# Patient Record
Sex: FEMALE | Race: WHITE | NOT HISPANIC OR LATINO | Employment: OTHER | URBAN - METROPOLITAN AREA
[De-identification: names, ages, dates, MRNs, and addresses within clinical notes are randomized per-mention and may not be internally consistent; named-entity substitution may affect disease eponyms.]

---

## 2019-06-12 ENCOUNTER — OFFICE VISIT CONVERTED (OUTPATIENT)
Dept: UROLOGY | Facility: CLINIC | Age: 54
End: 2019-06-12
Attending: UROLOGY

## 2019-06-12 ENCOUNTER — CONVERSION ENCOUNTER (OUTPATIENT)
Dept: SURGERY | Facility: CLINIC | Age: 54
End: 2019-06-12

## 2019-07-11 ENCOUNTER — HOSPITAL ENCOUNTER (OUTPATIENT)
Dept: PERIOP | Facility: HOSPITAL | Age: 54
Setting detail: HOSPITAL OUTPATIENT SURGERY
Discharge: HOME | End: 2019-07-11
Attending: UROLOGY

## 2019-07-22 LAB
COLOR STONE: NORMAL
COMPN STONE: NORMAL
CONV CA OXALATE DIHYDRATE: 10 %
CONV CA OXALATE MONOHYDRATE: 70 %
CONV CALCIUM PHOSPHATE: 20 %
CONV CALCULI COMMENT: NORMAL
CONV CALCULI DISCLAIMER: NORMAL
CONV CALCULI NOTE: NORMAL
NIDUS STONE QL: NORMAL
SIZE STONE: NORMAL MM
WT STONE: 31.5 MG

## 2019-07-26 ENCOUNTER — HOSPITAL ENCOUNTER (OUTPATIENT)
Dept: PERIOP | Facility: HOSPITAL | Age: 54
Setting detail: HOSPITAL OUTPATIENT SURGERY
Discharge: HOME | End: 2019-07-26
Attending: UROLOGY

## 2019-08-09 ENCOUNTER — HOSPITAL ENCOUNTER (OUTPATIENT)
Dept: PERIOP | Facility: HOSPITAL | Age: 54
Setting detail: HOSPITAL OUTPATIENT SURGERY
Discharge: HOME | End: 2019-08-09
Attending: UROLOGY

## 2019-08-16 ENCOUNTER — CONVERSION ENCOUNTER (OUTPATIENT)
Dept: SURGERY | Facility: CLINIC | Age: 54
End: 2019-08-16

## 2019-08-16 ENCOUNTER — OFFICE VISIT CONVERTED (OUTPATIENT)
Dept: UROLOGY | Facility: CLINIC | Age: 54
End: 2019-08-16
Attending: UROLOGY

## 2019-08-16 LAB
COLOR STONE: NORMAL
COMPN STONE: NORMAL
CONV CA OXALATE DIHYDRATE: 5 %
CONV CA OXALATE MONOHYDRATE: 70 %
CONV CALCIUM PHOSPHATE: 25 %
CONV CALCULI COMMENT: NORMAL
CONV CALCULI DISCLAIMER: NORMAL
CONV CALCULI NOTE: NORMAL
NIDUS STONE QL: NORMAL
SIZE STONE: NORMAL MM
WT STONE: 69.9 MG

## 2020-05-01 ENCOUNTER — TELEPHONE (OUTPATIENT)
Dept: NEUROSURGERY | Facility: CLINIC | Age: 55
End: 2020-05-01

## 2020-05-01 NOTE — TELEPHONE ENCOUNTER
5.1.20  lm for pt letting her know she needs to arrive at 10:30 am for paperwork and she MUST bring her films from [facility]/

## 2020-07-17 ENCOUNTER — APPOINTMENT (OUTPATIENT)
Dept: PREADMISSION TESTING | Facility: HOSPITAL | Age: 55
End: 2020-07-17

## 2020-07-17 VITALS
WEIGHT: 229 LBS | DIASTOLIC BLOOD PRESSURE: 62 MMHG | SYSTOLIC BLOOD PRESSURE: 113 MMHG | HEIGHT: 68 IN | BODY MASS INDEX: 34.71 KG/M2 | TEMPERATURE: 98.2 F | HEART RATE: 71 BPM | OXYGEN SATURATION: 99 % | RESPIRATION RATE: 16 BRPM

## 2020-07-17 LAB
ALBUMIN SERPL-MCNC: 4.1 G/DL (ref 3.5–5.2)
ALBUMIN/GLOB SERPL: 1.5 G/DL
ALP SERPL-CCNC: 67 U/L (ref 39–117)
ALT SERPL W P-5'-P-CCNC: 42 U/L (ref 1–33)
ANION GAP SERPL CALCULATED.3IONS-SCNC: 9 MMOL/L (ref 5–15)
AST SERPL-CCNC: 23 U/L (ref 1–32)
BASOPHILS # BLD AUTO: 0.03 10*3/MM3 (ref 0–0.2)
BASOPHILS NFR BLD AUTO: 0.4 % (ref 0–1.5)
BILIRUB SERPL-MCNC: 0.2 MG/DL (ref 0–1.2)
BUN SERPL-MCNC: 10 MG/DL (ref 6–20)
BUN/CREAT SERPL: 14.5 (ref 7–25)
CALCIUM SPEC-SCNC: 9.3 MG/DL (ref 8.6–10.5)
CHLORIDE SERPL-SCNC: 105 MMOL/L (ref 98–107)
CO2 SERPL-SCNC: 23 MMOL/L (ref 22–29)
CREAT SERPL-MCNC: 0.69 MG/DL (ref 0.57–1)
DEPRECATED RDW RBC AUTO: 42.1 FL (ref 37–54)
EOSINOPHIL # BLD AUTO: 0.1 10*3/MM3 (ref 0–0.4)
EOSINOPHIL NFR BLD AUTO: 1.4 % (ref 0.3–6.2)
ERYTHROCYTE [DISTWIDTH] IN BLOOD BY AUTOMATED COUNT: 12.9 % (ref 12.3–15.4)
GFR SERPL CREATININE-BSD FRML MDRD: 89 ML/MIN/1.73
GLOBULIN UR ELPH-MCNC: 2.8 GM/DL
GLUCOSE SERPL-MCNC: 91 MG/DL (ref 65–99)
HCT VFR BLD AUTO: 41.5 % (ref 34–46.6)
HGB BLD-MCNC: 13.7 G/DL (ref 12–15.9)
IMM GRANULOCYTES # BLD AUTO: 0.03 10*3/MM3 (ref 0–0.05)
IMM GRANULOCYTES NFR BLD AUTO: 0.4 % (ref 0–0.5)
LYMPHOCYTES # BLD AUTO: 2.4 10*3/MM3 (ref 0.7–3.1)
LYMPHOCYTES NFR BLD AUTO: 34.6 % (ref 19.6–45.3)
MCH RBC QN AUTO: 29.7 PG (ref 26.6–33)
MCHC RBC AUTO-ENTMCNC: 33 G/DL (ref 31.5–35.7)
MCV RBC AUTO: 90 FL (ref 79–97)
MONOCYTES # BLD AUTO: 0.79 10*3/MM3 (ref 0.1–0.9)
MONOCYTES NFR BLD AUTO: 11.4 % (ref 5–12)
NEUTROPHILS NFR BLD AUTO: 3.58 10*3/MM3 (ref 1.7–7)
NEUTROPHILS NFR BLD AUTO: 51.8 % (ref 42.7–76)
NRBC BLD AUTO-RTO: 0 /100 WBC (ref 0–0.2)
PLATELET # BLD AUTO: 343 10*3/MM3 (ref 140–450)
PMV BLD AUTO: 9 FL (ref 6–12)
POTASSIUM SERPL-SCNC: 4.4 MMOL/L (ref 3.5–5.2)
PROT SERPL-MCNC: 6.9 G/DL (ref 6–8.5)
RBC # BLD AUTO: 4.61 10*6/MM3 (ref 3.77–5.28)
SODIUM SERPL-SCNC: 137 MMOL/L (ref 136–145)
WBC # BLD AUTO: 6.93 10*3/MM3 (ref 3.4–10.8)

## 2020-07-17 PROCEDURE — C9803 HOPD COVID-19 SPEC COLLECT: HCPCS | Performed by: ORTHOPAEDIC SURGERY

## 2020-07-17 PROCEDURE — 85025 COMPLETE CBC W/AUTO DIFF WBC: CPT | Performed by: ORTHOPAEDIC SURGERY

## 2020-07-17 PROCEDURE — 93010 ELECTROCARDIOGRAM REPORT: CPT | Performed by: INTERNAL MEDICINE

## 2020-07-17 PROCEDURE — 80053 COMPREHEN METABOLIC PANEL: CPT | Performed by: ORTHOPAEDIC SURGERY

## 2020-07-17 PROCEDURE — 93005 ELECTROCARDIOGRAM TRACING: CPT

## 2020-07-17 PROCEDURE — U0004 COV-19 TEST NON-CDC HGH THRU: HCPCS | Performed by: ORTHOPAEDIC SURGERY

## 2020-07-17 PROCEDURE — 36415 COLL VENOUS BLD VENIPUNCTURE: CPT

## 2020-07-17 RX ORDER — FUROSEMIDE 40 MG/1
40 TABLET ORAL DAILY PRN
COMMUNITY
End: 2021-03-01

## 2020-07-17 NOTE — DISCHARGE INSTRUCTIONS
Take the following medications the morning of surgery:    NONE      ARRIVE AT 5:45    General Instructions:  • Do not eat solid food after midnight the night before surgery.  • You may drink clear liquids day of surgery but must stop at least one hour before your hospital arrival time.  • It is beneficial for you to have a clear drink that contains carbohydrates the day of surgery.  We suggest a 12 to 20 ounce bottle of Gatorade or Powerade for non-diabetic patients or a 12 to 20 ounce bottle of G2 or Powerade Zero for diabetic patients. (Pediatric patients, are not advised to drink a 12 to 20 ounce carbohydrate drink)    Clear liquids are liquids you can see through.  Nothing red in color.     Plain water                               Sports drinks  Sodas                                   Gelatin (Jell-O)  Fruit juices without pulp such as white grape juice and apple juice  Popsicles that contain no fruit or yogurt  Tea or coffee (no cream or milk added)  Gatorade / Powerade  G2 / Powerade Zero    • Infants may have breast milk up to four hours before surgery.  • Infants drinking formula may drink formula up to six hours before surgery.   • Patients who avoid smoking, chewing tobacco and alcohol for 4 weeks prior to surgery have a reduced risk of post-operative complications.  Quit smoking as many days before surgery as you can.  • Do not smoke, use chewing tobacco or drink alcohol the day of surgery.   • If applicable bring your C-PAP/ BI-PAP machine.  • Bring any papers given to you in the doctor’s office.  • Wear clean comfortable clothes.  • Do not wear contact lenses, false eyelashes or make-up.  Bring a case for your glasses.   • Bring crutches or walker if applicable.  • Remove all piercings.  Leave jewelry and any other valuables at home.  • Hair extensions with metal clips must be removed prior to surgery.  • The Pre-Admission Testing nurse will instruct you to bring medications if unable to obtain an  accurate list in Pre-Admission Testing.        If you were given a blood bank ID arm band remember to bring it with you the day of surgery.    Preventing a Surgical Site Infection:  • For 2 to 3 days before surgery, avoid shaving with a razor because the razor can irritate skin and make it easier to develop an infection.    • Any areas of open skin can increase the risk of a post-operative wound infection by allowing bacteria to enter and travel throughout the body.  Notify your surgeon if you have any skin wounds / rashes even if it is not near the expected surgical site.  The area will need assessed to determine if surgery should be delayed until it is healed.  • The night prior to surgery shower using a fresh bar of anti-bacterial soap (such as Dial) and clean washcloth.  Sleep in a clean bed with clean clothing.  Do not allow pets to sleep with you.  • Shower on the morning of surgery using a fresh bar of anti-bacterial soap (such as Dial) and clean washcloth.  Dry with a clean towel and dress in clean clothing.  • Ask your surgeon if you will be receiving antibiotics prior to surgery.  • Make sure you, your family, and all healthcare providers clean their hands with soap and water or an alcohol based hand  before caring for you or your wound.    Day of surgery:  Your arrival time is approximately two hours before your scheduled surgery time.  Upon arrival, a Pre-op nurse and Anesthesiologist will review your health history, obtain vital signs, and answer questions you may have.  The only belongings needed at this time will be a list of your home medications and if applicable your C-PAP/BI-PAP machine.  If you are staying overnight your family can leave the rest of your belongings in the car and bring them to your room later.  A Pre-op nurse will start an IV and you may receive medication in preparation for surgery, including something to help you relax.  Your family will be able to see you in the  Pre-op area.  Two visitors at a time will be allowed in the Pre-op room.  While you are in surgery your family should notify the waiting room  if they leave the waiting room area and provide a contact phone number.    Please be aware that surgery does come with discomfort.  We want to make every effort to control your discomfort so please discuss any uncontrolled symptoms with your nurse.   Your doctor will most likely have prescribed pain medications.      If you are going home after surgery you will receive individualized written care instructions before being discharged.  A responsible adult must drive you to and from the hospital on the day of your surgery and stay with you for 24 hours.    If you are staying overnight following surgery, you will be transported to your hospital room following the recovery period.  [facility] has all private rooms.    If you have any questions please call Pre-Admission Testing at [phone].  Deductibles and co-payments are collected on the day of service. Please be prepared to pay the required co-pay, deductible or deposit on the day of service as defined by your plan.    Patient Education for Self-Quarantine Process    Following your COVID testing, we strongly recommend that you do not leave your home after you have been tested for COVID except to get medical care. This includes not going to work, school or to public areas.  If this is not possible for you to do please limit your activities to only required outings.  Be sure to wear a mask when you are with other people, practice social distancing and wash your hands frequently.      The following items provide additional details to keep you safe.  • Wash your hands with soap and water frequently for at least 20 seconds.   • Avoid touching your eyes, nose and mouth with unwashed hands.  • Do not share anything - utensils, towels, food from the same bowl.   • Have your own utensils, drinking glass,  dishes, towels and bedding.   • Do not have visitors.   • Do use FaceTime to stay in touch with family and friends.  • You should stay in a specific room away from others if possible.   • Stay at least 6 feet away from others in the home if you cannot have a dedicated room to yourself.   • Do not snuggle with your pet. While the CDC says there is no evidence that pets can spread COVID-19 or be infected from humans, it is probably best to avoid “petting, snuggling, being kissed or licked and sharing food (during self-quarantine)”, according to the CDC.   • Sanitize household surfaces daily. Include all high touch areas (door handles, light switches, phones, countertops, etc.)  • Do not share a bathroom with others, if possible.   • Wear a mask around others in your home if you are unable to stay in a separate room or 6 feet apart. If  you are unable to wear a mask, have your family member wear a mask if they must be within 6 feet of you.   Call your surgeon immediately if you experience any of the following symptoms:  • Sore Throat  • Shortness of Breath or difficulty breathing  • Cough  • Chills  • Body soreness or muscle pain  • Headache  • Fever  • New loss of taste or smell  • Do not arrive for your surgery ill.  Your procedure will need to be rescheduled to another time.  You will need to call your physician before the day of surgery to avoid any unnecessary exposure to hospital staff as well as other patients.

## 2020-07-18 LAB
REF LAB TEST METHOD: NORMAL
SARS-COV-2 RNA RESP QL NAA+PROBE: NOT DETECTED

## 2020-07-20 ENCOUNTER — HOSPITAL ENCOUNTER (OUTPATIENT)
Facility: HOSPITAL | Age: 55
Setting detail: HOSPITAL OUTPATIENT SURGERY
Discharge: HOME | End: 2020-07-20
Attending: ORTHOPAEDIC SURGERY | Admitting: ORTHOPAEDIC SURGERY

## 2020-07-20 ENCOUNTER — ANESTHESIA (OUTPATIENT)
Dept: PERIOP | Facility: HOSPITAL | Age: 55
End: 2020-07-20

## 2020-07-20 ENCOUNTER — ANESTHESIA EVENT (OUTPATIENT)
Dept: PERIOP | Facility: HOSPITAL | Age: 55
End: 2020-07-20

## 2020-07-20 VITALS
DIASTOLIC BLOOD PRESSURE: 85 MMHG | SYSTOLIC BLOOD PRESSURE: 116 MMHG | RESPIRATION RATE: 16 BRPM | HEART RATE: 106 BPM | TEMPERATURE: 97.9 F | BODY MASS INDEX: 35.26 KG/M2 | WEIGHT: 231.92 LBS | OXYGEN SATURATION: 94 %

## 2020-07-20 PROCEDURE — C9290 INJ, BUPIVACAINE LIPOSOME: HCPCS | Performed by: ANESTHESIOLOGY

## 2020-07-20 PROCEDURE — 25010000002 NEOSTIGMINE PER 0.5 MG: Performed by: NURSE ANESTHETIST, CERTIFIED REGISTERED

## 2020-07-20 PROCEDURE — 25010000002 PROPOFOL 10 MG/ML EMULSION: Performed by: NURSE ANESTHETIST, CERTIFIED REGISTERED

## 2020-07-20 PROCEDURE — 25010000002 FENTANYL CITRATE (PF) 100 MCG/2ML SOLUTION: Performed by: ANESTHESIOLOGY

## 2020-07-20 PROCEDURE — 25010000002 MIDAZOLAM PER 1 MG: Performed by: ANESTHESIOLOGY

## 2020-07-20 PROCEDURE — 25010000002 DEXAMETHASONE PER 1 MG: Performed by: NURSE ANESTHETIST, CERTIFIED REGISTERED

## 2020-07-20 PROCEDURE — 25010000003 BUPIVACAINE LIPOSOME 1.3 % SUSPENSION: Performed by: ANESTHESIOLOGY

## 2020-07-20 PROCEDURE — 25010000002 ONDANSETRON PER 1 MG: Performed by: NURSE ANESTHETIST, CERTIFIED REGISTERED

## 2020-07-20 PROCEDURE — C1713 ANCHOR/SCREW BN/BN,TIS/BN: HCPCS | Performed by: ORTHOPAEDIC SURGERY

## 2020-07-20 DEVICE — SYS SUT/ANCH ACHILLES SPEEDBRIDGE BIOCOMP: Type: IMPLANTABLE DEVICE | Status: FUNCTIONAL

## 2020-07-20 DEVICE — WAX BONE HEMO NAT 2.5G: Type: IMPLANTABLE DEVICE | Status: FUNCTIONAL

## 2020-07-20 RX ORDER — FAMOTIDINE 10 MG/ML
20 INJECTION, SOLUTION INTRAVENOUS ONCE
Status: COMPLETED | OUTPATIENT
Start: 2020-07-20 | End: 2020-07-20

## 2020-07-20 RX ORDER — EPHEDRINE SULFATE 50 MG/ML
5 INJECTION, SOLUTION INTRAVENOUS ONCE AS NEEDED
Status: DISCONTINUED | OUTPATIENT
Start: 2020-07-20 | End: 2020-07-20 | Stop reason: HOSPADM

## 2020-07-20 RX ORDER — MIDAZOLAM HYDROCHLORIDE 1 MG/ML
2 INJECTION INTRAMUSCULAR; INTRAVENOUS
Status: COMPLETED | OUTPATIENT
Start: 2020-07-20 | End: 2020-07-20

## 2020-07-20 RX ORDER — SODIUM CHLORIDE, SODIUM LACTATE, POTASSIUM CHLORIDE, CALCIUM CHLORIDE 600; 310; 30; 20 MG/100ML; MG/100ML; MG/100ML; MG/100ML
9 INJECTION, SOLUTION INTRAVENOUS CONTINUOUS
Status: DISCONTINUED | OUTPATIENT
Start: 2020-07-20 | End: 2020-07-20 | Stop reason: HOSPADM

## 2020-07-20 RX ORDER — ONDANSETRON 2 MG/ML
4 INJECTION INTRAMUSCULAR; INTRAVENOUS ONCE AS NEEDED
Status: DISCONTINUED | OUTPATIENT
Start: 2020-07-20 | End: 2020-07-20 | Stop reason: HOSPADM

## 2020-07-20 RX ORDER — PROMETHAZINE HYDROCHLORIDE 25 MG/1
25 SUPPOSITORY RECTAL ONCE AS NEEDED
Status: DISCONTINUED | OUTPATIENT
Start: 2020-07-20 | End: 2020-07-20 | Stop reason: HOSPADM

## 2020-07-20 RX ORDER — PROMETHAZINE HYDROCHLORIDE 25 MG/ML
6.25 INJECTION, SOLUTION INTRAMUSCULAR; INTRAVENOUS ONCE AS NEEDED
Status: DISCONTINUED | OUTPATIENT
Start: 2020-07-20 | End: 2020-07-20 | Stop reason: HOSPADM

## 2020-07-20 RX ORDER — HYDRALAZINE HYDROCHLORIDE 20 MG/ML
5 INJECTION INTRAMUSCULAR; INTRAVENOUS
Status: DISCONTINUED | OUTPATIENT
Start: 2020-07-20 | End: 2020-07-20 | Stop reason: HOSPADM

## 2020-07-20 RX ORDER — LIDOCAINE HYDROCHLORIDE 10 MG/ML
0.5 INJECTION, SOLUTION EPIDURAL; INFILTRATION; INTRACAUDAL; PERINEURAL ONCE AS NEEDED
Status: DISCONTINUED | OUTPATIENT
Start: 2020-07-20 | End: 2020-07-20 | Stop reason: HOSPADM

## 2020-07-20 RX ORDER — PROPOFOL 10 MG/ML
VIAL (ML) INTRAVENOUS AS NEEDED
Status: DISCONTINUED | OUTPATIENT
Start: 2020-07-20 | End: 2020-07-20 | Stop reason: SURG

## 2020-07-20 RX ORDER — BUPIVACAINE HYDROCHLORIDE AND EPINEPHRINE 2.5; 5 MG/ML; UG/ML
INJECTION, SOLUTION EPIDURAL; INFILTRATION; INTRACAUDAL; PERINEURAL
Status: COMPLETED | OUTPATIENT
Start: 2020-07-20 | End: 2020-07-20

## 2020-07-20 RX ORDER — OXYCODONE AND ACETAMINOPHEN 7.5; 325 MG/1; MG/1
1 TABLET ORAL ONCE AS NEEDED
Status: DISCONTINUED | OUTPATIENT
Start: 2020-07-20 | End: 2020-07-20 | Stop reason: HOSPADM

## 2020-07-20 RX ORDER — MAGNESIUM HYDROXIDE 1200 MG/15ML
LIQUID ORAL AS NEEDED
Status: DISCONTINUED | OUTPATIENT
Start: 2020-07-20 | End: 2020-07-20 | Stop reason: HOSPADM

## 2020-07-20 RX ORDER — CLINDAMYCIN PHOSPHATE 600 MG/50ML
600 INJECTION INTRAVENOUS EVERY 8 HOURS
Status: COMPLETED | OUTPATIENT
Start: 2020-07-20 | End: 2020-07-20

## 2020-07-20 RX ORDER — DEXAMETHASONE SODIUM PHOSPHATE 10 MG/ML
INJECTION INTRAMUSCULAR; INTRAVENOUS AS NEEDED
Status: DISCONTINUED | OUTPATIENT
Start: 2020-07-20 | End: 2020-07-20 | Stop reason: SURG

## 2020-07-20 RX ORDER — FLUMAZENIL 0.1 MG/ML
0.2 INJECTION INTRAVENOUS AS NEEDED
Status: DISCONTINUED | OUTPATIENT
Start: 2020-07-20 | End: 2020-07-20 | Stop reason: HOSPADM

## 2020-07-20 RX ORDER — HYDROMORPHONE HYDROCHLORIDE 1 MG/ML
0.5 INJECTION, SOLUTION INTRAMUSCULAR; INTRAVENOUS; SUBCUTANEOUS
Status: DISCONTINUED | OUTPATIENT
Start: 2020-07-20 | End: 2020-07-20 | Stop reason: HOSPADM

## 2020-07-20 RX ORDER — BUPIVACAINE HYDROCHLORIDE AND EPINEPHRINE 5; 5 MG/ML; UG/ML
INJECTION, SOLUTION EPIDURAL; INTRACAUDAL; PERINEURAL
Status: COMPLETED | OUTPATIENT
Start: 2020-07-20 | End: 2020-07-20

## 2020-07-20 RX ORDER — SODIUM CHLORIDE 0.9 % (FLUSH) 0.9 %
3 SYRINGE (ML) INJECTION EVERY 12 HOURS SCHEDULED
Status: DISCONTINUED | OUTPATIENT
Start: 2020-07-20 | End: 2020-07-20 | Stop reason: HOSPADM

## 2020-07-20 RX ORDER — FENTANYL CITRATE 50 UG/ML
100 INJECTION, SOLUTION INTRAMUSCULAR; INTRAVENOUS
Status: DISCONTINUED | OUTPATIENT
Start: 2020-07-20 | End: 2020-07-20 | Stop reason: HOSPADM

## 2020-07-20 RX ORDER — GLYCOPYRROLATE 0.2 MG/ML
INJECTION INTRAMUSCULAR; INTRAVENOUS AS NEEDED
Status: DISCONTINUED | OUTPATIENT
Start: 2020-07-20 | End: 2020-07-20 | Stop reason: SURG

## 2020-07-20 RX ORDER — PROMETHAZINE HYDROCHLORIDE 25 MG/1
25 TABLET ORAL ONCE AS NEEDED
Status: DISCONTINUED | OUTPATIENT
Start: 2020-07-20 | End: 2020-07-20 | Stop reason: HOSPADM

## 2020-07-20 RX ORDER — ROCURONIUM BROMIDE 10 MG/ML
INJECTION, SOLUTION INTRAVENOUS AS NEEDED
Status: DISCONTINUED | OUTPATIENT
Start: 2020-07-20 | End: 2020-07-20 | Stop reason: SURG

## 2020-07-20 RX ORDER — FENTANYL CITRATE 50 UG/ML
50 INJECTION, SOLUTION INTRAMUSCULAR; INTRAVENOUS
Status: DISCONTINUED | OUTPATIENT
Start: 2020-07-20 | End: 2020-07-20 | Stop reason: HOSPADM

## 2020-07-20 RX ORDER — HYDROCODONE BITARTRATE AND ACETAMINOPHEN 7.5; 325 MG/1; MG/1
1 TABLET ORAL ONCE AS NEEDED
Status: DISCONTINUED | OUTPATIENT
Start: 2020-07-20 | End: 2020-07-20 | Stop reason: HOSPADM

## 2020-07-20 RX ORDER — LIDOCAINE HYDROCHLORIDE 20 MG/ML
INJECTION, SOLUTION INFILTRATION; PERINEURAL AS NEEDED
Status: DISCONTINUED | OUTPATIENT
Start: 2020-07-20 | End: 2020-07-20 | Stop reason: SURG

## 2020-07-20 RX ORDER — ONDANSETRON 2 MG/ML
INJECTION INTRAMUSCULAR; INTRAVENOUS AS NEEDED
Status: DISCONTINUED | OUTPATIENT
Start: 2020-07-20 | End: 2020-07-20 | Stop reason: SURG

## 2020-07-20 RX ORDER — SODIUM CHLORIDE 0.9 % (FLUSH) 0.9 %
3-10 SYRINGE (ML) INJECTION AS NEEDED
Status: DISCONTINUED | OUTPATIENT
Start: 2020-07-20 | End: 2020-07-20 | Stop reason: HOSPADM

## 2020-07-20 RX ADMIN — GLYCOPYRROLATE 0.2 MG: 0.2 INJECTION INTRAMUSCULAR; INTRAVENOUS at 07:27

## 2020-07-20 RX ADMIN — ONDANSETRON HYDROCHLORIDE 4 MG: 2 SOLUTION INTRAMUSCULAR; INTRAVENOUS at 07:30

## 2020-07-20 RX ADMIN — LIDOCAINE HYDROCHLORIDE 40 MG: 20 INJECTION, SOLUTION INFILTRATION; PERINEURAL at 07:30

## 2020-07-20 RX ADMIN — FENTANYL CITRATE 100 MCG: 50 INJECTION, SOLUTION INTRAMUSCULAR; INTRAVENOUS at 06:33

## 2020-07-20 RX ADMIN — MIDAZOLAM 2 MG: 1 INJECTION INTRAMUSCULAR; INTRAVENOUS at 06:33

## 2020-07-20 RX ADMIN — Medication 2.5 MG: at 08:40

## 2020-07-20 RX ADMIN — ROCURONIUM BROMIDE 50 MG: 10 INJECTION, SOLUTION INTRAVENOUS at 07:30

## 2020-07-20 RX ADMIN — GLYCOPYRROLATE 0.4 MG: 0.2 INJECTION INTRAMUSCULAR; INTRAVENOUS at 08:40

## 2020-07-20 RX ADMIN — FAMOTIDINE 20 MG: 10 INJECTION, SOLUTION INTRAVENOUS at 06:25

## 2020-07-20 RX ADMIN — MIDAZOLAM 2 MG: 1 INJECTION INTRAMUSCULAR; INTRAVENOUS at 07:38

## 2020-07-20 RX ADMIN — PROPOFOL 100 MG: 10 INJECTION, EMULSION INTRAVENOUS at 07:38

## 2020-07-20 RX ADMIN — DEXAMETHASONE SODIUM PHOSPHATE 8 MG: 10 INJECTION INTRAMUSCULAR; INTRAVENOUS at 07:39

## 2020-07-20 RX ADMIN — SODIUM CHLORIDE, POTASSIUM CHLORIDE, SODIUM LACTATE AND CALCIUM CHLORIDE 9 ML/HR: 600; 310; 30; 20 INJECTION, SOLUTION INTRAVENOUS at 06:24

## 2020-07-20 RX ADMIN — BUPIVACAINE 10 ML: 13.3 INJECTION, SUSPENSION, LIPOSOMAL INFILTRATION at 06:46

## 2020-07-20 RX ADMIN — BUPIVACAINE HYDROCHLORIDE AND EPINEPHRINE 10 ML: 2.5; 5 INJECTION, SOLUTION EPIDURAL; INFILTRATION; INTRACAUDAL; PERINEURAL at 06:46

## 2020-07-20 RX ADMIN — CLINDAMYCIN PHOSPHATE 600 MG: 12 INJECTION, SOLUTION INTRAVENOUS at 07:41

## 2020-07-20 RX ADMIN — BUPIVACAINE HYDROCHLORIDE AND EPINEPHRINE BITARTRATE 10 ML: 5; .0091 INJECTION, SOLUTION EPIDURAL; INTRACAUDAL; PERINEURAL at 06:46

## 2020-07-20 RX ADMIN — PROPOFOL 200 MG: 10 INJECTION, EMULSION INTRAVENOUS at 07:30

## 2020-07-20 NOTE — DISCHARGE INSTRUCTIONS
WHAT YOU CAN EXPECT  1. Swelling:  This is to be expected. It is difficult to specify what constitutes as an abnormal amount of swelling. You can minimize this by staying off your feet, keeping the foot elevated and applying ice.  2. Pain:  Everyone experiences pain, unfortunately, some worse than others.  You will be given a prescription for pain medication before you leave the hospital, either by Dr. Rodriguez or one of his assistants.  Please feel free to check with us if you are allergic to any pain medication. If you have had local anesthesia, start taking the medication when the anesthesia begins to wear off.  3. Bleeding:  This always occurs.  You will notice slight oozing occasionally through the bandages.  If bleeding continues and soaks through the dressing please call us.    WHAT TO DO AFTER SURGERY  1. When you return home you must rest.  You may either sit in a chair or in bed, with the foot elevated.  The position of the foot should be above the level of the heart.  Propping the foot up on a few pillows always helps.  2. Do not do any excessive or unnecessary walking during the first few days after surgery.  May get up during the first 48 hours only to eat and use the restroom.  Each operation is slightly different, and you may be specifically told that no walking is allowed at all for a period of time.  Under these circumstances, you will be best off using a crutch or walker.  3. If you are given a special shoe to use after surgery, you may not walk without it.  You do not, however, have to wear the shoe at night.  You will find it easier to commence walking on your heel and put more weight on the flat foot over the course of the next few days.  4. Use ice over the foot for about 24 hours after surgery.  The small commercially available ice packs are useless.  Fill a large garbage bag with ice and prop the bag over the foot, not on the ankle.  Alternatively, place the ice bag on the bed and turn on your  side with the foot lying directly on the ice pack.  5. If you are taking pain medication, there are common side effects such as dizziness, drowsiness, and nausea.  If these or an allergic reaction occur, stop taking the medication and call us.  To prevent nausea, eat prior to taking the medication.    DO NOT DO ANY OF THE FOLLOWING  1. Do not get the bandages wet.  When bathing, either sponge bathe or hang the foot over the side of the bath.  The safest is to get into the empty tub with the shoe on, elevate the foot, and then fill the tub.  Preferably, empty the tub prior to getting out.  Showering is possible with commercial plastic protectors.  2. Don't remove your dressing unless specifically instructed to do so. You may loosen the elastic wrap if needed for tightness.    *Please understand that the postoperative course varies from patient to patient, and these are only meant as guidelines to a smoother recovery.  These instructions do not cover all aspects of your post-operative care and recovery and if you have any questions which you feel are unanswered by this instruction sheet, please call us.    Please call us if you develop a fever associated with redness or drainage around the wound.  We are interested in your prompt and healthy recovery.  Please cooperate with the above instructions. Please call the office for a follow-up appt at [phone].

## 2020-07-20 NOTE — ANESTHESIA PROCEDURE NOTES
Airway  Urgency: elective    Date/Time: 7/20/2020 7:32 AM  Airway not difficult    General Information and Staff    Patient location during procedure: OR  Anesthesiologist: Matt Abdi MD  CRNA: Aydee Roland CRNA    Indications and Patient Condition  Indications for airway management: airway protection    Preoxygenated: yes  Mask difficulty assessment: 2 - vent by mask + OA or adjuvant +/- NMBA    Final Airway Details  Final airway type: endotracheal airway      Successful airway: ETT  Cuffed: yes   Successful intubation technique: direct laryngoscopy  Facilitating devices/methods: intubating stylet  Endotracheal tube insertion site: oral  Blade: Fang  Blade size: 2  ETT size (mm): 7.0  Cormack-Lehane Classification: grade I - full view of glottis  Placement verified by: chest auscultation and capnometry   Cuff volume (mL): 7  Measured from: teeth  ETT/EBT  to teeth (cm): 20  Number of attempts at approach: 1  Assessment: lips, teeth, and gum same as pre-op and atraumatic intubation

## 2020-07-20 NOTE — ANESTHESIA POSTPROCEDURE EVALUATION
Patient: Dorita Narayan    Procedure Summary     Date:  07/20/20 Room / Location:   [facility]  [facility]    Anesthesia Start:  0723 Anesthesia Stop:  0849    Procedure:  RIGHT CALCANEAL EXOSTECTOMY AND RETROCALCANEAL BURSECTOMY, SECONDARY ACHILLES TENDON REPAIR RECONSTRUCTION ACHILLES TENDON REPAIR (Right Ankle) Diagnosis:      Surgeon:  Chetan Rodriguez MD Provider:  Matt Abdi MD    Anesthesia Type:  general ASA Status:  2          Anesthesia Type: general    Vitals  Vitals Value Taken Time   /97 7/20/2020 10:00 AM   Temp 36.6 °C (97.9 °F) 7/20/2020 10:00 AM   Pulse 70 7/20/2020 10:01 AM   Resp 16 7/20/2020 10:00 AM   SpO2 95 % 7/20/2020 10:00 AM           Post Anesthesia Care and Evaluation    Patient location during evaluation: bedside  Patient participation: complete - patient participated  Level of consciousness: awake  Pain score: 1  Pain management: adequate  Airway patency: patent  Anesthetic complications: No anesthetic complications  PONV Status: controlled  Cardiovascular status: acceptable  Respiratory status: acceptable  Hydration status: acceptable    Comments: --------------------            07/20/20               1003     --------------------   BP:       116/85     Pulse:     106       Resp:       16       Temp:                SpO2:      94%      --------------------

## 2020-07-20 NOTE — ANESTHESIA PROCEDURE NOTES
Peripheral Block    Pre-sedation assessment completed: 7/20/2020 6:35 AM    Patient reassessed immediately prior to procedure    Patient location during procedure: pre-op  Start time: 7/20/2020 6:35 AM  Stop time: 7/20/2020 6:45 AM  Reason for block: at surgeon's request and post-op pain management  Performed by  Anesthesiologist: Andi Alejandro MD  Preanesthetic Checklist  Completed: patient identified, site marked, surgical consent, pre-op evaluation, timeout performed, IV checked, risks and benefits discussed and monitors and equipment checked  Prep:  Pt Position: supine  Sterile barriers:cap, gloves, mask and sterile barriers  Prep: ChloraPrep  Patient monitoring: blood pressure monitoring, continuous pulse oximetry and EKG  Procedure  Sedation:yes  Performed under: local infiltration  Guidance:ultrasound guided  ULTRASOUND INTERPRETATION.  Using ultrasound guidance a 22 G gauge needle was placed in close proximity to the femoral and sciatic nerve, at which point, under ultrasound guidance anesthetic was injected in the area of the nerve and spread of the anesthesia was seen on ultrasound in close proximity thereto.  There were no abnormalities seen on ultrasound; a digital image was taken; and the patient tolerated the procedure with no complications. Images:still images obtained    Laterality:right  Block Type:adductor canal block, popliteal and sciatic  Injection Technique:single-shot  Needle Type:echogenic  Needle Gauge:22 G  Resistance on Injection: less than 15 psi    Medications Used: bupivacaine liposome (EXPAREL) 1.3 % injection, 10 mL  bupivacaine-EPINEPHrine PF (MARCAINE w/EPI) 0.25% -1:471078 injection, 10 mL  bupivacaine-EPINEPHrine PF (MARCAINE w/EPI) 0.5% -1:088155 injection, 10 mL  Med admintered at 7/20/2020 6:46 AM      Post Assessment  Injection Assessment: negative aspiration for heme, no paresthesia on injection and incremental injection  Patient Tolerance:comfortable throughout  block  Complications:no  Additional Notes

## 2020-07-20 NOTE — ANESTHESIA PREPROCEDURE EVALUATION
Anesthesia Evaluation     Patient summary reviewed and Nursing notes reviewed   NPO Solid Status: > 8 hours             Airway   Mallampati: II  TM distance: >3 FB  Neck ROM: full  no difficulty expected  Dental - normal exam     Pulmonary - negative pulmonary ROS and normal exam   Cardiovascular - negative cardio ROS and normal exam        Neuro/Psych- negative ROS  GI/Hepatic/Renal/Endo    (+) obesity,       Musculoskeletal (-) negative ROS    Abdominal  - normal exam   Substance History - negative use     OB/GYN negative ob/gyn ROS         Other                        Anesthesia Plan    ASA 2     general   (Pop/add popc)  intravenous induction     Anesthetic plan, all risks, benefits, and alternatives have been provided, discussed and informed consent has been obtained with: patient.    Plan discussed with CRNA.

## 2020-07-20 NOTE — OP NOTE
Orthopedic Operative Report      Patient: Dorita Narayan  YOB: 1965  Medical Record Number: [id number]  Date: 7/20/2020    Attending Provider: Chetan Rodriguez MD  Assistant: none     Admitting Diagnosis: Right calcaneal exostosis, retrocalcaneal bursitis, partial Achilles tendon rear  Post Procedure Diagnosis: Same    Anesthesia: General   Complications: None  Estimated Blood Loss: minimal  Specimen: None  Drain: none                             Surgical Procedure:  Excision of right calcaneal exostosis and heterotopic ossification, excision of retrocalcaneal bursa, Secondary Achilles Tendon Reconstruction    After appropriate preoperative consultation, the patient was made comfortable in the supine position on the transfer gurney and appropriate anesthesia administered.  A surgical pause was undertaken to identify the appropriate patient, surgical site, surgeon, and the surgeon’s initials on the operative limb.  The patient was then very carefully transferred to the prone position on the operating table where great care was taken to pad all bony prominences and axillary support was placed bilaterally.  Abdomen was allowed to hang free.  Parenteral antibiotics are given.  A well-padded proximal thigh tourniquet was applied.  Prep and drape was done in the usual sterile fashion for the involved extremity distal to the knee.  Anatomic landmarks were noted and marked with a pen at the landmarks.     Straight longitudinal posterior approach was made on the distal extremity and carried onto the posterior heel.  Sharp dissection was carried through skin and dermis only protecting the nerves.  Flaps are not raised and a no-touch tissue technique was used.  Blunt dissection was taken deep to dermis.  Cautery used sparingly as needed for hemostasis.  The peritenon was then incised longitudinally for the length of the wound in the direction of the fibers, identified and saved as a discrete anatomic layer for  repair at the end of the case.  A central Achilles approach was then used carried down to the heel and Achilles insertion point.  Nonviable tendon was identified.  Meticulous dissection and debridement of the nonviable torn and frayed dysvascular tendon segments are resected along with the heterotopic ossification that is found within the substance of the tendon.  We dissected as far medially and laterally as possible without detaching the native Achilles insertion points.  Scissor, microsaggital saw, and rongeur, as well as a file were then used to remove the central distal portion of the patient's calcaneal exostosis and heterotopic bone.  This area was made fresh and allowed to bleed for healing purposes in the future.    We next placed, after gentle dissection medially and laterally without detaching any fibers, Abel retractors.  Microsagittal saw and chisel blade were then used to resect the Enrike prominence, which was quite large.  It had been serrating the anterior tendon fibers.      The wound was then copiously irrigated, meticulous hemostasis obtained.  Bone wax was used on the Enrike prominence resection cancellous bone only.  Wound was irrigated again and another check of hemostasis done.  An anchoring suture of 0 Vicryl was placed proximally and distally to prevent propagation of the tear burying the knots anteriorly and within the substance of the tendon.  There was absolutely no medial, lateral or anterior stripping of blood supply.  Another check of fine tuning of the debridement is done sharply.    A double row Arthrex Speed Bridge anchor technique was then used to reattach the Achilles to this nice calcaneal footprint at the appropriate gastrosoleus length and tension.  This was a knotless technique with no retrocalcaneal prominence.  Sterile bone wax was used on the cancellous bone where the Enrike prominence was removed.    We then meticulously repaired in layers starting anterior and deep  burying the knots with 0 Vicryl simple sutures.  The tendon was then repaired side to side in anatomic fashion at the appropriate length and tension relationship to the gastrocsoleus complex.  Tapered needles were used.  2-0 and 3-0 Vicryl were used as well as needed.  Even with the most superficial fibers for repairing the peritenon, all of the knots were buried and left within the substance of the tendon.     Circulation had returned promptly upon releasing the tourniquet and all flaps were viable.  There was excellent range of motion and palpable continuity in the remaining tendon.  Inverted Vicryl sutures were used to approximate the dermis so there was absolutely no tension on the skin edges, which was everted without tension and repaired with staples and Prolene where indicated.  A sterile dressing was applied with gentle Ace wrap compression over anterior and posterior plaster splints with the foot and ankle in gentle equinus to protect the repair.  The patient was turned to the supine position on the transfer [facility] after having tolerated the procedure well.               Chetan Rodriguez MD  7/20/2020  08:42

## 2020-08-25 ENCOUNTER — HOSPITAL ENCOUNTER (OUTPATIENT)
Dept: CARDIOLOGY | Facility: HOSPITAL | Age: 55
Discharge: HOME | End: 2020-08-25
Admitting: ORTHOPAEDIC SURGERY

## 2020-08-25 ENCOUNTER — TRANSCRIBE ORDERS (OUTPATIENT)
Dept: ADMINISTRATIVE | Facility: HOSPITAL | Age: 55
End: 2020-08-25

## 2020-08-25 ENCOUNTER — HOSPITAL ENCOUNTER (EMERGENCY)
Facility: HOSPITAL | Age: 55
Discharge: HOME | End: 2020-08-25
Attending: EMERGENCY MEDICINE | Admitting: EMERGENCY MEDICINE

## 2020-08-25 VITALS
HEIGHT: 68 IN | HEART RATE: 85 BPM | TEMPERATURE: 99.1 F | OXYGEN SATURATION: 97 % | RESPIRATION RATE: 17 BRPM | WEIGHT: 212 LBS | BODY MASS INDEX: 32.13 KG/M2 | DIASTOLIC BLOOD PRESSURE: 94 MMHG | SYSTOLIC BLOOD PRESSURE: 153 MMHG

## 2020-08-25 DIAGNOSIS — M79.661 PAIN AND SWELLING OF RIGHT LOWER LEG: ICD-10-CM

## 2020-08-25 DIAGNOSIS — M79.89 PAIN AND SWELLING OF RIGHT LOWER LEG: Primary | ICD-10-CM

## 2020-08-25 DIAGNOSIS — M79.89 PAIN AND SWELLING OF RIGHT LOWER LEG: ICD-10-CM

## 2020-08-25 DIAGNOSIS — I82.401 ACUTE DEEP VEIN THROMBOSIS (DVT) OF RIGHT LOWER EXTREMITY, UNSPECIFIED VEIN (HCC): Primary | ICD-10-CM

## 2020-08-25 DIAGNOSIS — M79.661 PAIN AND SWELLING OF RIGHT LOWER LEG: Primary | ICD-10-CM

## 2020-08-25 LAB
ALBUMIN SERPL-MCNC: 4.2 G/DL (ref 3.5–5.2)
ALBUMIN/GLOB SERPL: 1.6 G/DL
ALP SERPL-CCNC: 63 U/L (ref 39–117)
ALT SERPL W P-5'-P-CCNC: 30 U/L (ref 1–33)
ANION GAP SERPL CALCULATED.3IONS-SCNC: 8.3 MMOL/L (ref 5–15)
APTT PPP: 28.2 SECONDS (ref 22.7–35.4)
AST SERPL-CCNC: 30 U/L (ref 1–32)
BASOPHILS # BLD AUTO: 0.05 10*3/MM3 (ref 0–0.2)
BASOPHILS NFR BLD AUTO: 0.6 % (ref 0–1.5)
BH CV LOW VAS RIGHT DISTAL FEMORAL SPONT: 1
BH CV LOW VAS RIGHT GASTRONEMIUS VESSEL: 1
BH CV LOW VAS RIGHT PERONEAL VESSEL: 1
BH CV LOW VAS RIGHT POPLITEAL SPONT: 1
BH CV LOW VAS RIGHT SOLEAL VESSEL: 1
BH CV LOWER VASCULAR LEFT COMMON FEMORAL AUGMENT: NORMAL
BH CV LOWER VASCULAR LEFT COMMON FEMORAL COMPETENT: NORMAL
BH CV LOWER VASCULAR LEFT COMMON FEMORAL COMPRESS: NORMAL
BH CV LOWER VASCULAR LEFT COMMON FEMORAL PHASIC: NORMAL
BH CV LOWER VASCULAR LEFT COMMON FEMORAL SPONT: NORMAL
BH CV LOWER VASCULAR RIGHT COMMON FEMORAL AUGMENT: NORMAL
BH CV LOWER VASCULAR RIGHT COMMON FEMORAL COMPETENT: NORMAL
BH CV LOWER VASCULAR RIGHT COMMON FEMORAL COMPRESS: NORMAL
BH CV LOWER VASCULAR RIGHT COMMON FEMORAL PHASIC: NORMAL
BH CV LOWER VASCULAR RIGHT COMMON FEMORAL SPONT: NORMAL
BH CV LOWER VASCULAR RIGHT DISTAL FEMORAL AUGMENT: NORMAL
BH CV LOWER VASCULAR RIGHT DISTAL FEMORAL COMPETENT: NORMAL
BH CV LOWER VASCULAR RIGHT DISTAL FEMORAL COMPRESS: NORMAL
BH CV LOWER VASCULAR RIGHT DISTAL FEMORAL PHASIC: NORMAL
BH CV LOWER VASCULAR RIGHT DISTAL FEMORAL SPONT: NORMAL
BH CV LOWER VASCULAR RIGHT DISTAL FEMORAL THROMBUS: NORMAL
BH CV LOWER VASCULAR RIGHT GASTRONEMIUS COMPRESS: NORMAL
BH CV LOWER VASCULAR RIGHT GASTRONEMIUS THROMBUS: NORMAL
BH CV LOWER VASCULAR RIGHT GREATER SAPH AK COMPRESS: NORMAL
BH CV LOWER VASCULAR RIGHT GREATER SAPH BK COMPRESS: NORMAL
BH CV LOWER VASCULAR RIGHT LESSER SAPH COMPRESS: NORMAL
BH CV LOWER VASCULAR RIGHT MID FEMORAL AUGMENT: NORMAL
BH CV LOWER VASCULAR RIGHT MID FEMORAL COMPETENT: NORMAL
BH CV LOWER VASCULAR RIGHT MID FEMORAL COMPRESS: NORMAL
BH CV LOWER VASCULAR RIGHT MID FEMORAL PHASIC: NORMAL
BH CV LOWER VASCULAR RIGHT MID FEMORAL SPONT: NORMAL
BH CV LOWER VASCULAR RIGHT PERONEAL COMPRESS: NORMAL
BH CV LOWER VASCULAR RIGHT PERONEAL THROMBUS: NORMAL
BH CV LOWER VASCULAR RIGHT POPLITEAL AUGMENT: NORMAL
BH CV LOWER VASCULAR RIGHT POPLITEAL COMPETENT: NORMAL
BH CV LOWER VASCULAR RIGHT POPLITEAL COMPRESS: NORMAL
BH CV LOWER VASCULAR RIGHT POPLITEAL PHASIC: NORMAL
BH CV LOWER VASCULAR RIGHT POPLITEAL SPONT: NORMAL
BH CV LOWER VASCULAR RIGHT POPLITEAL THROMBUS: NORMAL
BH CV LOWER VASCULAR RIGHT PROFUNDA FEMORAL COMPRESS: NORMAL
BH CV LOWER VASCULAR RIGHT PROXIMAL FEMORAL COMPRESS: NORMAL
BH CV LOWER VASCULAR RIGHT SAPHENOFEMORAL JUNCTION COMPRESS: NORMAL
BH CV LOWER VASCULAR RIGHT SOLEAL COMPRESS: NORMAL
BH CV LOWER VASCULAR RIGHT SOLEAL THROMBUS: NORMAL
BILIRUB SERPL-MCNC: 0.3 MG/DL (ref 0–1.2)
BUN SERPL-MCNC: 9 MG/DL (ref 6–20)
BUN/CREAT SERPL: 12.3 (ref 7–25)
CALCIUM SPEC-SCNC: 9.6 MG/DL (ref 8.6–10.5)
CHLORIDE SERPL-SCNC: 104 MMOL/L (ref 98–107)
CO2 SERPL-SCNC: 26.7 MMOL/L (ref 22–29)
CREAT SERPL-MCNC: 0.73 MG/DL (ref 0.57–1)
DEPRECATED RDW RBC AUTO: 44.8 FL (ref 37–54)
EOSINOPHIL # BLD AUTO: 0.11 10*3/MM3 (ref 0–0.4)
EOSINOPHIL NFR BLD AUTO: 1.4 % (ref 0.3–6.2)
ERYTHROCYTE [DISTWIDTH] IN BLOOD BY AUTOMATED COUNT: 13.7 % (ref 12.3–15.4)
GFR SERPL CREATININE-BSD FRML MDRD: 83 ML/MIN/1.73
GLOBULIN UR ELPH-MCNC: 2.7 GM/DL
GLUCOSE SERPL-MCNC: 89 MG/DL (ref 65–99)
HCT VFR BLD AUTO: 38.1 % (ref 34–46.6)
HGB BLD-MCNC: 12.5 G/DL (ref 12–15.9)
IMM GRANULOCYTES # BLD AUTO: 0.02 10*3/MM3 (ref 0–0.05)
IMM GRANULOCYTES NFR BLD AUTO: 0.2 % (ref 0–0.5)
INR PPP: 0.98 (ref 0.9–1.1)
LYMPHOCYTES # BLD AUTO: 2.62 10*3/MM3 (ref 0.7–3.1)
LYMPHOCYTES NFR BLD AUTO: 32.7 % (ref 19.6–45.3)
MCH RBC QN AUTO: 29.7 PG (ref 26.6–33)
MCHC RBC AUTO-ENTMCNC: 32.8 G/DL (ref 31.5–35.7)
MCV RBC AUTO: 90.5 FL (ref 79–97)
MONOCYTES # BLD AUTO: 0.95 10*3/MM3 (ref 0.1–0.9)
MONOCYTES NFR BLD AUTO: 11.8 % (ref 5–12)
NEUTROPHILS NFR BLD AUTO: 4.27 10*3/MM3 (ref 1.7–7)
NEUTROPHILS NFR BLD AUTO: 53.3 % (ref 42.7–76)
NRBC BLD AUTO-RTO: 0 /100 WBC (ref 0–0.2)
PLATELET # BLD AUTO: 308 10*3/MM3 (ref 140–450)
PMV BLD AUTO: 8.9 FL (ref 6–12)
POTASSIUM SERPL-SCNC: 4 MMOL/L (ref 3.5–5.2)
PROT SERPL-MCNC: 6.9 G/DL (ref 6–8.5)
PROTHROMBIN TIME: 12.9 SECONDS (ref 11.7–14.2)
RBC # BLD AUTO: 4.21 10*6/MM3 (ref 3.77–5.28)
SODIUM SERPL-SCNC: 139 MMOL/L (ref 136–145)
WBC # BLD AUTO: 8.02 10*3/MM3 (ref 3.4–10.8)

## 2020-08-25 PROCEDURE — 85730 THROMBOPLASTIN TIME PARTIAL: CPT | Performed by: EMERGENCY MEDICINE

## 2020-08-25 PROCEDURE — 99283 EMERGENCY DEPT VISIT LOW MDM: CPT

## 2020-08-25 PROCEDURE — 80053 COMPREHEN METABOLIC PANEL: CPT | Performed by: EMERGENCY MEDICINE

## 2020-08-25 PROCEDURE — 93971 EXTREMITY STUDY: CPT

## 2020-08-25 PROCEDURE — 85025 COMPLETE CBC W/AUTO DIFF WBC: CPT | Performed by: EMERGENCY MEDICINE

## 2020-08-25 PROCEDURE — 85610 PROTHROMBIN TIME: CPT | Performed by: EMERGENCY MEDICINE

## 2020-08-25 RX ORDER — POTASSIUM CHLORIDE 750 MG/1
TABLET, FILM COATED, EXTENDED RELEASE ORAL
COMMUNITY
Start: 2020-07-29 | End: 2021-03-01

## 2020-08-25 RX ORDER — SULFASALAZINE 500 MG/1
500 TABLET ORAL 2 TIMES DAILY
COMMUNITY
Start: 2020-07-23

## 2020-08-25 NOTE — PROGRESS NOTES
8/25/20 preliminary results are positive for above knee and calf acute deep vein thrombosis.  Paged on call doctor for [facility] at 5:30. Paged again at approximately 5:50. Waited until 6:15 and called the  (Nikkie) as I had not gotten a return page. She told me to call surgery and ask for ordering Dr. Gomez cell number. Left message on his cell phone at approximately 6:15PM. Spoke with my manager and was advised to urge patient to go to ED otherwise to get a hold of patients PCP.  While attempting to get ahold of PCP, Dr. Rodriguez called at approximately 7:15 and he also urged patient to go to the ED. On call Dr. Tatum called at 7:25. Patient was taken in wheelchair to the ED.

## 2020-08-26 NOTE — ED PROVIDER NOTES
EMERGENCY DEPARTMENT ENCOUNTER    Room Number:  44/44  Date of encounter:  8/25/2020  PCP: Alan Wilkerson APRN    HPI:  Context: Dorita Narayan is a 54 y.o. female who presents to the ED c/o chief complaint of right leg swelling.  Patient had a surgery with Dr. Rodriguez done at the end of last month.  Patient had swelling her entire leg, swelling has improved but continued to have swelling most significant at her foot and ankle.  Patient had her second follow-up visit with Dr. Rodriguez today, had order for an outpatient ultrasound, significant for DVT, begins in distal femoral and then extends distally.  No proximal clot found.  Patient was referred to the emergency department for further management.  Patient denies any redness or warmth to the leg.  No chest pain or shortness of breath.  No history of blood clots in the past.  Patient denies any hematemesis, no blood in stool, no dark tarry stools, no hematuria.    MEDICAL HISTORY REVIEW  Reviewed in EPIC    PAST MEDICAL HISTORY  Active Ambulatory Problems     Diagnosis Date Noted   • No Active Ambulatory Problems     Resolved Ambulatory Problems     Diagnosis Date Noted   • No Resolved Ambulatory Problems     Past Medical History:   Diagnosis Date   • Achilles tendonitis    • Edema    • Familial adenomatous polyposis    • History of cellulitis 2019   • History of kidney stones    • History of migraine        PAST SURGICAL HISTORY  Past Surgical History:   Procedure Laterality Date   • ACHILLES TENDON SURGERY Right 7/20/2020    Procedure: RIGHT CALCANEAL EXOSTECTOMY AND RETROCALCANEAL BURSECTOMY, SECONDARY ACHILLES TENDON REPAIR RECONSTRUCTION ACHILLES TENDON REPAIR;  Surgeon: Chetan Rodriguez MD;  [place] [facility] [place] [facility] [place]  Service: Orthopedics;  Laterality: Right;   • COLONOSCOPY     • CREATION / REVISION OF ILEOSTOMY / JEJUNOSTOMY     • KIDNEY STONE SURGERY      X2   • RESECTION SMALL BOWEL / CLOSURE ILEOSTOMY     • TUBAL ABDOMINAL LIGATION         FAMILY  HISTORY  Family History   Problem Relation Age of Onset   • Malig Hyperthermia Neg Hx        SOCIAL HISTORY  Social History     Socioeconomic History   • Marital status:      Spouse name: Not on file   • Number of children: Not on file   • Years of education: Not on file   • Highest education level: Not on file   Tobacco Use   • Smoking status: Former Smoker     Packs/day: 0.25     Years: 8.00     Pack years: 2.00     Types: Cigarettes     Last attempt to quit: 2020     Years since quittin.1   • Smokeless tobacco: Never Used   Substance and Sexual Activity   • Alcohol use: Defer   • Drug use: Never   • Sexual activity: Defer       ALLERGIES  Latex; Penicillins; and Iodine    The patient's allergies have been reviewed    REVIEW OF SYSTEMS  All systems reviewed and negative except for those discussed in HPI.     PHYSICAL EXAM  I have reviewed the triage vital signs and nursing notes.  ED Triage Vitals [20]   Temp Heart Rate Resp BP SpO2   99.1 °F (37.3 °C) 79 18 -- 99 %      Temp src Heart Rate Source Patient Position BP Location FiO2 (%)   Tympanic Monitor -- -- --     GENERAL: No acute distress  HENT: NCAT, PERRL, Nares patent  EYES: no scleral icterus  NECK: trachea midline, no ROM limitations  CV: regular rhythm, regular rate  RESPIRATORY: normal effort  ABDOMEN: soft  : deferred  MUSCULOSKELETAL: no deformity.  Right lower extremity: Boot in place.  Nonpitting edema distal calf and dorsum of the foot.  No signs of infection.  No palpable cord, positive Homans.  NEURO: alert, moves all extremities, follows commands  SKIN: warm, dry    LAB RESULTS  Recent Results (from the past 24 hour(s))   Duplex venous lower extremity right CAR    Collection Time: 20  5:34 PM   Result Value Ref Range    Right Distal Femoral Spont 1     Right Popliteal Spont 1     Right Peroneal Vessel 1     Right GastronemiusSoleal Vessel 1     Right Common Femoral Spont Y     Right Common Femoral Phasic Y      Right Common Femoral Augment Y     Right Common Femoral Competent Y     Right Common Femoral Compress C     Right Saphenofemoral Junction Compress C     Right Profunda Femoral Compress C     Right Proximal Femoral Compress C     Right Mid Femoral Spont Y     Right Mid Femoral Phasic Y     Right Mid Femoral Augment Y     Right Mid Femoral Competent Y     Right Mid Femoral Compress C     Right Distal Femoral Spont N     Right Distal Femoral Phasic N     Right Distal Femoral Augment N     Right Distal Femoral Competent N     Right Distal Femoral Compress P     Right Distal Femoral Thrombus A     Right Popliteal Spont N     Right Popliteal Phasic N     Right Popliteal Augment N     Right Popliteal Competent N     Right Popliteal Compress P     Right Popliteal Thrombus A     Right Peroneal Compress P     Right Peroneal Thrombus A     Right GastronemiusSoleal Compress P     Right GastronemiusSoleal Thrombus A     Right Greater Saph AK Compress C     Right Greater Saph BK Compress C     Right Lesser Saph Compress C     Left Common Femoral Spont Y     Left Common Femoral Phasic Y     Left Common Femoral Augment Y     Left Common Femoral Competent Y     Left Common Femoral Compress C     BH CV LOW VAS RIGHT SOLEAL VESSEL 1     BH CV LOWER VASCULAR RIGHT SOLEAL COMPRESS P     BH CV LOWER VASCULAR RIGHT SOLEAL THROMBUS A    Comprehensive Metabolic Panel    Collection Time: 08/25/20  9:10 PM   Result Value Ref Range    Glucose 89 65 - 99 mg/dL    BUN 9 6 - 20 mg/dL    Creatinine 0.73 0.57 - 1.00 mg/dL    Sodium 139 136 - 145 mmol/L    Potassium 4.0 3.5 - 5.2 mmol/L    Chloride 104 98 - 107 mmol/L    CO2 26.7 22.0 - 29.0 mmol/L    Calcium 9.6 8.6 - 10.5 mg/dL    Total Protein 6.9 6.0 - 8.5 g/dL    Albumin 4.20 3.50 - 5.20 g/dL    ALT (SGPT) 30 1 - 33 U/L    AST (SGOT) 30 1 - 32 U/L    Alkaline Phosphatase 63 39 - 117 U/L    Total Bilirubin 0.3 0.0 - 1.2 mg/dL    eGFR Non African Amer 83 >60 mL/min/1.73    Globulin 2.7 gm/dL     A/G Ratio 1.6 g/dL    BUN/Creatinine Ratio 12.3 7.0 - 25.0    Anion Gap 8.3 5.0 - 15.0 mmol/L   Protime-INR    Collection Time: 08/25/20  9:10 PM   Result Value Ref Range    Protime 12.9 11.7 - 14.2 Seconds    INR 0.98 0.90 - 1.10   aPTT    Collection Time: 08/25/20  9:10 PM   Result Value Ref Range    PTT 28.2 22.7 - 35.4 seconds   CBC Auto Differential    Collection Time: 08/25/20  9:10 PM   Result Value Ref Range    WBC 8.02 3.40 - 10.80 10*3/mm3    RBC 4.21 3.77 - 5.28 10*6/mm3    Hemoglobin 12.5 12.0 - 15.9 g/dL    Hematocrit 38.1 34.0 - 46.6 %    MCV 90.5 79.0 - 97.0 fL    MCH 29.7 26.6 - 33.0 pg    MCHC 32.8 31.5 - 35.7 g/dL    RDW 13.7 12.3 - 15.4 %    RDW-SD 44.8 37.0 - 54.0 fl    MPV 8.9 6.0 - 12.0 fL    Platelets 308 140 - 450 10*3/mm3    Neutrophil % 53.3 42.7 - 76.0 %    Lymphocyte % 32.7 19.6 - 45.3 %    Monocyte % 11.8 5.0 - 12.0 %    Eosinophil % 1.4 0.3 - 6.2 %    Basophil % 0.6 0.0 - 1.5 %    Immature Grans % 0.2 0.0 - 0.5 %    Neutrophils, Absolute 4.27 1.70 - 7.00 10*3/mm3    Lymphocytes, Absolute 2.62 0.70 - 3.10 10*3/mm3    Monocytes, Absolute 0.95 (H) 0.10 - 0.90 10*3/mm3    Eosinophils, Absolute 0.11 0.00 - 0.40 10*3/mm3    Basophils, Absolute 0.05 0.00 - 0.20 10*3/mm3    Immature Grans, Absolute 0.02 0.00 - 0.05 10*3/mm3    nRBC 0.0 0.0 - 0.2 /100 WBC       I ordered the above labs and reviewed the results.    RADIOLOGY  No Radiology Exams Resulted Within Past 24 Hours    I ordered the above noted radiological studies. I reviewed the images and results. I agree with the radiologist interpretation.    PROCEDURES  Procedures    MEDICATIONS GIVEN IN ER  Medications - No data to display    PROGRESS, DATA ANALYSIS, CONSULTS, AND MEDICAL DECISION MAKING  A complete history and physical exam have been performed.  All available laboratory and imaging results have been reviewed by myself prior to disposition.  Patient was placed in face mask in first look. Patient was wearing facemask when I entered  the room and throughout our encounter. I wore full protective equipment throughout this patient encounter including a face mask, and gloves. Hand hygiene was performed before donning protective equipment and after removal when leaving the room.  MDM  After the initial H&P, I discussed pertinent information from history and physical exam with patient/family.  Discussed differential diagnosis.  Discussed plan for ED evaluation/work-up/treatment.  All questions answered.  Patient/family is agreeable with plan.  ED Course as of Aug 25 2158   Tue Aug 25, 2020   2036 Patient reports that she is upset about needing blood work performed.  I discussed with her that is the recommendation to obtain coagulation factors prior to the initiation of anticoagulants.  Patient reports that she had blood work done a month prior.  I discussed with her that given the seriousness of this medication, we would need to obtain lab work today.  Patient is agreeable.    [JG]   2155 Patient was given extensive counseling regarding anticoagulation and increased risk of bleeding.    [JG]   2157 The patient was reexamined.  They have had symptomatic improvement during their ED stay.  I discussed today's findings with the patient, explaining the pertinent positives and negatives from today's visit, and the plan of care.  Discussed plan for discharge as there is no emergent indication for admission.  Discussed limitation of the ED work-up and that this is to rule out life-threatening emergencies but that they could require further testing as determined by their primary care and or any referred specialist patient is agreeable and understands need for follow-up and repeat exam/testing.  Patient is aware that discharge does not mean there is nothing wrong, indicates no emergency is present, and that they must continue their care with their primary care physician and/or any referred specialist.  They were given appropriate follow-up with their primary  care physician and/or specialist.  I had an extensive discussion on the expected clinical course and return precautions.  Patient understands to return to the emergency department for continuation, worsening, or new symptoms.  I answered any of the patient's questions. Patient was discharged home in a stable condition.        [JG]      ED Course User Index  [JG] Valentín Hurst MD       AS OF 21:58 VITALS:    BP - 137/81  HR - 79  TEMP - 99.1 °F (37.3 °C) (Tympanic)  O2 SATS - 99%    DIAGNOSIS  Final diagnoses:   Acute deep vein thrombosis (DVT) of right lower extremity, unspecified vein (CMS/HCC)         DISPOSITION  DISCHARGE    Patient discharged in stable condition.    Reviewed implications of results, diagnosis, meds, responsibility to follow up, warning signs and symptoms of possible worsening, potential complications and reasons to return to ER.    Patient/Family voiced understanding of above instructions.    Discussed plan for discharge, as there is no emergent indication for admission. Patient referred to primary care provider for BP management due to today's BP. Pt/family is agreeable and understands need for follow up and repeat testing.  Pt is aware that discharge does not mean that nothing is wrong but it indicates no emergency is present that requires admission and they must continue care with follow-up as given below or physician of their choice.     FOLLOW-UP  Alan Wilkerson, APRN  [address]   [facility] [address]  [facility] [address] [phone]    Schedule an appointment as soon as possible for a visit in 2 days  even if well    Mark Gonzalez II, MD  [address] [facility] [facility] [address]  [facility] [id number] [phone]    Schedule an appointment as soon as possible for a visit in 2 days  for further management of your anticoagulation         Medication List      New Prescriptions    Eliquis DVT/PE Starter Pack tablet  Take two 5 mg tablets by mouth every 12 hours for 7 days. Followed by one   5 mg tablet every  12 hours. (Dispense starter pack if available)           Valentín Hurst MD  08/25/20 0498

## 2020-10-05 NOTE — PROGRESS NOTES
Subjective   History of Present Illness: Dorita Narayan is a 54 y.o. female is being seen for consultation today at the request of BRITT Juarez for low back pain.  Patient had lumbar MRI at [facility]     Patient states that she has been having low back issues for 12+ years. Patient states that pain in her lower back radiates down to bilateral legs.    Patient states that she has had 3 BAN's in previously with no relief.      Patient states that she has N/T on her left side. Patient statess that her legs swell while standing for long periods of time while at work.  Patient is not currently taking any medication for pain.    This patient is here because of a chronic history of 12 years of low back pain that is progressed to bilateral buttock and hip pain.  Occasionally she has bilateral radiating buttock and leg pain.  She is recovering from a right foot surgery done in July of this year and still has a boot on it and is undergoing physical therapy.  She is were 26 years for forward doing extremely physical work.  She is been off of work since July.  Over the years she has had increasing amounts of back pain and some bilateral buttock and hip pain and more leg pain.  She has no motor deficits.  She has been through epidural blocks twice while once earlier this year which were only mildly effective.  A series 11 years ago worked quite well up until recently.  She has seen the [facility] 11 years ago.  Not recently.  She is having increasing pain and has a known grade 2 to grade 3 isthmic spondylolisthesis at L5-S1.  This might be the time to consider surgery but she needs some new imaging.  In addition she has a lot of chronic left-sided lower thoracic back pain for which dry needling has been tried and only helped temporarily.  She has a grade 2 and even up to grade 3 spondylolisthesis which if operated upon might need a front and back approach and she might actually need to be seen again by the  [facility] but will start with getting some new imaging of both the lumbar spine with flexion-extension films and the MRI and a thoracic MRI.  The thoracic back pain might be helped by an ablation but will sort some of these issues out when she comes back.      Back Pain  This is a chronic problem. The current episode started more than 1 year ago. The problem occurs daily. The pain is present in the sacro-iliac. The quality of the pain is described as aching, burning, cramping, shooting and stabbing. The pain radiates to the right foot, right knee and right thigh. The pain is at a severity of 8/10. The pain is worse during the night. The symptoms are aggravated by lying down and standing. Stiffness is present all day. Associated symptoms include leg pain, numbness, paresthesias, pelvic pain, tingling and weakness. Pertinent negatives include no abdominal pain, bladder incontinence, bowel incontinence, chest pain, dysuria, fever, headaches, paresis, perianal numbness or weight loss. The treatment provided moderate relief.   Leg Pain   The incident occurred more than 1 week ago. The pain is present in the right leg and right knee. The quality of the pain is described as shooting. The pain is at a severity of 4/10. The pain is moderate. The pain has been intermittent since onset. Associated symptoms include muscle weakness, numbness and tingling. The treatment provided mild relief.       The following portions of the patient's history were reviewed and updated as appropriate: allergies, current medications, past family history, past medical history, past social history, past surgical history and problem list.    Review of Systems   Constitutional: Negative for chills, fever and weight loss.   HENT: Negative for congestion.    Cardiovascular: Negative for chest pain.   Gastrointestinal: Negative for abdominal pain and bowel incontinence.   Genitourinary: Positive for pelvic pain. Negative for bladder incontinence,  "decreased urine volume, difficulty urinating and dysuria.   Musculoskeletal: Positive for back pain and joint swelling.        Right knee   Neurological: Positive for tingling, weakness, numbness and paresthesias. Negative for headaches.           Objective     Vitals:    10/12/20 0903   BP: 131/82   Cuff Size: Adult   Pulse: 81   Temp: 97.9 °F (36.6 °C)   Weight: 96.2 kg (212 lb)   Height: 172.7 cm (68\")     Body mass index is 32.23 kg/m².      Physical Exam  Constitutional:       Appearance: She is well-developed.   HENT:      Head: Normocephalic and atraumatic.   Eyes:      Extraocular Movements: EOM normal.      Conjunctiva/sclera: Conjunctivae normal.      Pupils: Pupils are equal, round, and reactive to light.      Funduscopic exam:     Right eye: No papilledema.         Left eye: No papilledema.   Neck:      Vascular: No carotid bruit.   Neurological:      Mental Status: She is oriented to person, place, and time.      Coordination: Finger-Nose-Finger Test and Heel to Shin Test normal.      Gait: Gait is intact.      Deep Tendon Reflexes:      Reflex Scores:       Tricep reflexes are 2+ on the right side and 2+ on the left side.       Bicep reflexes are 2+ on the right side and 2+ on the left side.       Brachioradialis reflexes are 2+ on the right side and 2+ on the left side.       Patellar reflexes are 2+ on the right side and 2+ on the left side.       Achilles reflexes are 2+ on the right side and 2+ on the left side.  Psychiatric:         Speech: Speech normal.       Neurologic Exam     Mental Status   Oriented to person, place, and time.   Registration of memory: Good recent and remote memory.   Attention: normal. Concentration: normal.   Speech: speech is normal   Level of consciousness: alert  Knowledge: consistent with education.     Cranial Nerves     CN II   Visual fields full to confrontation.   Visual acuity: normal    CN III, IV, VI   Pupils are equal, round, and reactive to light.  Extraocular " motions are normal.     CN V   Facial sensation intact.   Right corneal reflex: normal  Left corneal reflex: normal    CN VII   Facial expression full, symmetric.   Right facial weakness: none  Left facial weakness: none    CN VIII   Hearing: intact    CN IX, X   Palate: symmetric    CN XI   Right sternocleidomastoid strength: normal  Left sternocleidomastoid strength: normal    CN XII   Tongue: not atrophic  Tongue deviation: none    Motor Exam   Muscle bulk: normal  Right arm tone: normal  Left arm tone: normal  Right leg tone: normal  Left leg tone: normal    Strength   Strength 5/5 except as noted.     Sensory Exam   Light touch normal.     Gait, Coordination, and Reflexes     Gait  Gait: normal    Coordination   Finger to nose coordination: normal  Heel to shin coordination: normal    Reflexes   Right brachioradialis: 2+  Left brachioradialis: 2+  Right biceps: 2+  Left biceps: 2+  Right triceps: 2+  Left triceps: 2+  Right patellar: 2+  Left patellar: 2+  Right achilles: 2+  Left achilles: 2+  Right : 2+  Left : 2+          Assessment/Plan   Independent Review of Radiographic Studies:      I personally reviewed the images from the following studies.    I reviewed the MRI done in September 2019 which showed evidence of a grade 2 possibly grade 3 L5-S1 isthmic spondylolisthesis with root compression.  Agree with the report.        Medical Decision Making:      We need to update her imaging and will get a thoracic and lumbar MRI as well as flexion-extension films and have her come back to discuss.  There is a possibility if that she needs surgical treatment she might need to be reevaluated at the [facility] spine clinic because of the degree of her spondylolisthesis and the fact that she may need a front and back operation.      Dorita was seen today for back pain.    Diagnoses and all orders for this visit:    Congenital spondylolysis, lumbosacral region  -     MRI Lumbar Spine Without Contrast;  Future  -     MRI Thoracic Spine Without Contrast; Future  -     XR Spine Lumbar Complete With Flex & Ext; Future    DDD (degenerative disc disease), lumbar  -     MRI Lumbar Spine Without Contrast; Future  -     MRI Thoracic Spine Without Contrast; Future  -     XR Spine Lumbar Complete With Flex & Ext; Future    Chronic left-sided thoracic back pain  -     MRI Lumbar Spine Without Contrast; Future  -     MRI Thoracic Spine Without Contrast; Future  -     XR Spine Lumbar Complete With Flex & Ext; Future      No follow-ups on file.         Answers for HPI/ROS submitted by the patient on 10/5/2020   Back pain  What is the primary reason for your visit?: Back Pain

## 2020-10-09 ENCOUNTER — TELEPHONE (OUTPATIENT)
Dept: NEUROSURGERY | Facility: CLINIC | Age: 55
End: 2020-10-09

## 2020-10-09 NOTE — TELEPHONE ENCOUNTER
I called and left message on patient's voicemail to call the office regarding prescreening, wear a mask, bring imaging disk and no visitors in the office.  Will need to arrive at 830 am for your appointment for your paperwork.

## 2020-10-12 ENCOUNTER — OFFICE VISIT (OUTPATIENT)
Dept: NEUROSURGERY | Facility: CLINIC | Age: 55
End: 2020-10-12

## 2020-10-12 VITALS
TEMPERATURE: 97.9 F | SYSTOLIC BLOOD PRESSURE: 131 MMHG | HEIGHT: 68 IN | DIASTOLIC BLOOD PRESSURE: 82 MMHG | HEART RATE: 81 BPM | WEIGHT: 212 LBS | BODY MASS INDEX: 32.13 KG/M2

## 2020-10-12 DIAGNOSIS — M51.36 DDD (DEGENERATIVE DISC DISEASE), LUMBAR: ICD-10-CM

## 2020-10-12 DIAGNOSIS — M54.6 CHRONIC LEFT-SIDED THORACIC BACK PAIN: ICD-10-CM

## 2020-10-12 DIAGNOSIS — Q76.2 CONGENITAL SPONDYLOLYSIS, LUMBOSACRAL REGION: Primary | ICD-10-CM

## 2020-10-12 DIAGNOSIS — G89.29 CHRONIC LEFT-SIDED THORACIC BACK PAIN: ICD-10-CM

## 2020-10-12 PROBLEM — M51.369 DDD (DEGENERATIVE DISC DISEASE), LUMBAR: Status: ACTIVE | Noted: 2020-10-12

## 2020-10-12 PROCEDURE — 99244 OFF/OP CNSLTJ NEW/EST MOD 40: CPT | Performed by: NEUROLOGICAL SURGERY

## 2020-10-29 ENCOUNTER — HOSPITAL ENCOUNTER (OUTPATIENT)
Dept: MRI IMAGING | Facility: HOSPITAL | Age: 55
Discharge: HOME | End: 2020-10-29

## 2020-10-29 ENCOUNTER — HOSPITAL ENCOUNTER (OUTPATIENT)
Dept: GENERAL RADIOLOGY | Facility: HOSPITAL | Age: 55
Discharge: HOME | End: 2020-10-29

## 2020-10-29 ENCOUNTER — TELEPHONE (OUTPATIENT)
Dept: NEUROSURGERY | Facility: CLINIC | Age: 55
End: 2020-10-29

## 2020-10-29 DIAGNOSIS — G89.29 CHRONIC LEFT-SIDED THORACIC BACK PAIN: ICD-10-CM

## 2020-10-29 DIAGNOSIS — M54.6 CHRONIC LEFT-SIDED THORACIC BACK PAIN: ICD-10-CM

## 2020-10-29 DIAGNOSIS — Q76.2 CONGENITAL SPONDYLOLYSIS, LUMBOSACRAL REGION: ICD-10-CM

## 2020-10-29 DIAGNOSIS — M51.36 DDD (DEGENERATIVE DISC DISEASE), LUMBAR: ICD-10-CM

## 2020-10-29 PROCEDURE — 72146 MRI CHEST SPINE W/O DYE: CPT

## 2020-10-29 PROCEDURE — 72148 MRI LUMBAR SPINE W/O DYE: CPT

## 2020-10-29 PROCEDURE — 72114 X-RAY EXAM L-S SPINE BENDING: CPT

## 2020-10-29 NOTE — TELEPHONE ENCOUNTER
I called and left message on patient’s voicemail regarding the appointment with Dr. Galan on 11-3-20 @ 130 pm has been changed to 11-2-20 @ 9 am.

## 2020-11-02 ENCOUNTER — OFFICE VISIT (OUTPATIENT)
Dept: NEUROSURGERY | Facility: CLINIC | Age: 55
End: 2020-11-02

## 2020-11-02 VITALS
HEIGHT: 68 IN | DIASTOLIC BLOOD PRESSURE: 80 MMHG | SYSTOLIC BLOOD PRESSURE: 130 MMHG | WEIGHT: 212 LBS | HEART RATE: 73 BPM | TEMPERATURE: 97.4 F | BODY MASS INDEX: 32.13 KG/M2

## 2020-11-02 DIAGNOSIS — Q76.2 CONGENITAL SPONDYLOLYSIS, LUMBOSACRAL REGION: Primary | ICD-10-CM

## 2020-11-02 DIAGNOSIS — M51.36 DDD (DEGENERATIVE DISC DISEASE), LUMBAR: ICD-10-CM

## 2020-11-02 DIAGNOSIS — M54.6 CHRONIC LEFT-SIDED THORACIC BACK PAIN: ICD-10-CM

## 2020-11-02 DIAGNOSIS — G89.29 CHRONIC LEFT-SIDED THORACIC BACK PAIN: ICD-10-CM

## 2020-11-02 PROCEDURE — 99214 OFFICE O/P EST MOD 30 MIN: CPT | Performed by: NEUROLOGICAL SURGERY

## 2020-11-10 ENCOUNTER — TELEPHONE (OUTPATIENT)
Dept: NEUROSURGERY | Facility: CLINIC | Age: 55
End: 2020-11-10

## 2020-11-10 NOTE — TELEPHONE ENCOUNTER
Caller: PATIENT      Best call back number: [phone]    What form or medical record are you requesting: FMLA    Who is requesting this form or medical record from you: [facility]    How would you like to receive the form or medical records (pick-up, mail, fax): FAX & PATIENT REQUEST AN COPY OF PAPERWORK    Timeframe paperwork needed: BEFORE 19TH    Additional notes: PATIENT CALLED ASKING TO SPEAK TO ANJANA TO CHECK THE STATUS OF FMLA THAT WAS FAXED [phone] YESTERDAY (11/9) FROM [facility]. 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 FMLA MUST BE RETURNED AS SOON AS POSSIBLE, PREFERABLY BEFORE THE 19TH OF NOV. PATIENT INDICATES THAT DR. POLO STATES HE WILL ASSIST WITH FMLA UNTIL PATIENT ESTABLISHES CARE WITH ORTHO SURGEON IN FEB. IF THERE ARE ANY ADDITIONAL QUESTIONS OR COMMENT, CATRINA ([facility]) CAN BE REACHED AT EXT [phone].    PATIENT CAN BE CONTACTED WITH AN UPDATE

## 2020-11-10 NOTE — TELEPHONE ENCOUNTER
PT CALLED - SHE WAS ON THE PHONE WITH SARAH WHEN THE OFFICE NUMBER CALLED HER. SHE WAS UNABLE TO ANSWER THE CALL IN TIME. PT WOULD LIKE TO KNOW IF CALL BACK WAS IN REGARDS TO FMLA FORMS OR ANOTHER MATTER?    PLEASE CALL PT [phone] IF NEEDED TO REACH HER REGARDING ANOTHER MATTER. IF HER CONVERSATION WITH SARAH ANSWERED ALL THE FMLA QUESTIONS NO NEED TO CALL BACK.     THANK YOU.

## 2020-12-17 ENCOUNTER — OFFICE VISIT CONVERTED (OUTPATIENT)
Dept: NEUROSURGERY | Facility: CLINIC | Age: 55
End: 2020-12-17
Attending: NEUROLOGICAL SURGERY

## 2021-01-18 ENCOUNTER — TELEPHONE (OUTPATIENT)
Dept: NEUROSURGERY | Facility: CLINIC | Age: 56
End: 2021-01-18

## 2021-01-18 NOTE — TELEPHONE ENCOUNTER
Caller: GENTRY BAZZI    Relationship: PT    Best call back number: [phone]    What form or medical record are you requesting: PHYSICIAN'S STATEMENT / SSDI BENEFITS APPLICATION PAPERWORK    Who is requesting this form or medical record from you: RICKY     How would you like to receive the form or medical records (pick-up, mail, fax): FAX TO [facility], MAIL COPY TO PT  If fax, what is the fax number: PT STATES IT IS ON THE PAPERWORK    If mail, what is the address:     [address] [place] [id number]    Timeframe paperwork needed: 2/5/21    Additional notes: PT STATES SHE DROPPED OFF PAPERWORK 1/11/21 AT  FOR [employer]. PT IS REQUESTING AN UPDATE FROM [employer] ON THE STATUS OF THE FORMS.    PT REQUESTS A COPY OF COMPLETED FORMS BE MAILED TO HER AS WELL.    PLEASE CALL PT TO UPDATE ON STATUS.    THANK YOU.

## 2021-01-25 NOTE — TELEPHONE ENCOUNTER
Provider: DR POLO  Caller: GENTRY BAZZI  Relationship to Patient: SELF    Phone Number: [phone]    Reason for Call: PT WOULD LIKE A COPY  OF HER LAST PHYSICANS STATEMENT/ SOCIAL SECURITY PAPERS PLEASE

## 2021-03-01 ENCOUNTER — OFFICE VISIT (OUTPATIENT)
Dept: NEUROSURGERY | Facility: CLINIC | Age: 56
End: 2021-03-01

## 2021-03-01 VITALS — TEMPERATURE: 97.2 F | HEART RATE: 74 BPM | DIASTOLIC BLOOD PRESSURE: 76 MMHG | SYSTOLIC BLOOD PRESSURE: 124 MMHG

## 2021-03-01 DIAGNOSIS — M51.36 DDD (DEGENERATIVE DISC DISEASE), LUMBAR: ICD-10-CM

## 2021-03-01 DIAGNOSIS — Q76.2 CONGENITAL SPONDYLOLYSIS, LUMBOSACRAL REGION: Primary | ICD-10-CM

## 2021-03-01 PROCEDURE — 99213 OFFICE O/P EST LOW 20 MIN: CPT | Performed by: NEUROLOGICAL SURGERY

## 2021-05-04 ENCOUNTER — HOSPITAL ENCOUNTER (EMERGENCY)
Dept: HOSPITAL 49 - FER | Age: 56
LOS: 1 days | Discharge: OTHER INSTITUTION | End: 2021-05-05
Payer: COMMERCIAL

## 2021-05-04 DIAGNOSIS — I26.09: Primary | ICD-10-CM

## 2021-05-04 DIAGNOSIS — Z91.041: ICD-10-CM

## 2021-05-04 DIAGNOSIS — Z98.890: ICD-10-CM

## 2021-05-04 DIAGNOSIS — Z86.711: ICD-10-CM

## 2021-05-04 DIAGNOSIS — Z88.0: ICD-10-CM

## 2021-05-04 DIAGNOSIS — I21.4: ICD-10-CM

## 2021-05-04 LAB
ALBUMIN SERPL-MCNC: 3.8 G/DL (ref 3.4–5)
ALKALINE PHOSHATASE: 110 U/L (ref 46–116)
ALT SERPL-CCNC: 29 U/L (ref 14–59)
AST: 22 U/L (ref 15–37)
BACTERIA: (no result)
BASOPHIL: 0.6 % (ref 0–2)
BILIRUBIN - TOTAL: 0.3 MG/DL (ref 0.2–1)
BILIRUBIN: NEGATIVE MG/DL
BLOOD: (no result) ERY/UL
BUN SERPL-MCNC: 7 MG/DL (ref 7–18)
BUN/CREAT RATIO (CALC): 9.2 RATIO
CHLORIDE: 103 MMOL/L (ref 98–107)
CLARITY UR: (no result)
CO2 (BICARBONATE): 22 MMOL/L (ref 21–32)
COLOR: YELLOW
CREATININE: 0.76 MG/DL (ref 0.51–0.95)
D DIMER PPP FEU-MCNC: 18.29 UG/MLFEU (ref 0–0.41)
EOSINOPHIL: 0.9 % (ref 0–5)
GLOBULIN (CALCULATION): 4.3 G/DL
GLUCOSE (U): NORMAL MG/DL
GLUCOSE SERPL-MCNC: 114 MG/DL (ref 74–106)
HCT: 37.6 % (ref 37–47)
HGB BLD-MCNC: 11.7 G/DL (ref 12.5–16)
INR PPP: 1.09 (ref 0.9–1.2)
IRON % SATURATION: 7.2 %SAT (ref 20–50)
IRON SERPL-MCNC: 28 UG/DL (ref 50–170)
LACTIC ACID: 3.5 MMOL/L (ref 0.4–1.9)
LEUKOCYTES: NEGATIVE LEU/UL
LYMPHOCYTE: 28.2 % (ref 15–48)
MAGNESIUM SERPL-MCNC: 1.9 MG/DL (ref 1.8–2.4)
MCH RBC QN AUTO: 29.6 PG (ref 25–31)
MCHC RBC AUTO-ENTMCNC: 31.1 G/DL (ref 32–36)
MCV: 95.2 FL (ref 78–100)
MONOCYTE: 11.3 % (ref 0–12)
MPV: 8.8 FL (ref 6–9.5)
NEUTROPHIL: 58.8 % (ref 41–80)
NITRITE: NEGATIVE MG/DL
NRBC: 0
PLT: 367 K/UL (ref 150–400)
POTASSIUM: 3.9 MMOL/L (ref 3.5–5.1)
PRO-BNP: 282 PG/ML (ref ?–125)
PROTEIN: NEGATIVE MG/DL
PROTHROMBIN TIME: 13.4 SECONDS (ref 11.4–13.6)
PTT: 30.1 SECONDS (ref 22.2–34.7)
RBC MORPHOLOGY: NORMAL
RBC: 3.95 M/UL (ref 4.2–5.4)
RDW: 14.1 % (ref 11.5–14)
SPECIFIC GRAVITY: 1.01 (ref 1–1.03)
SQUAMOUS EPITHELIAL CELLS: (no result)
TOTAL PROTEIN: 8.1 G/DL (ref 6.4–8.2)
URINARY RBC: (no result)
URINARY WBC: (no result)
UROBILINOGEN: 0.2 MG/DL (ref 0.2–1)
WBC: 8.8 K/UL (ref 4–10.5)

## 2021-05-10 NOTE — H&P
History and Physical      Patient Name: Dorita Narayan   Patient ID: [id number]   Sex: Female   YOB: 1965    Primary Care Provider: Armand Azar MD   Referring Provider: Aydee JEAN-BAPTISTE    Visit Date: December 17, 2020    Provider: Pedro Forte MD   Location: [facility]   Location Address: [address] [place] [address]  [id number]   Location Phone: [phone]          Chief Complaint  · Back pain      History Of Present Illness  The patient is a 55 year old /White female, who presents on referral from Aydee JEAN-BAPTISTE, for a neurosurgical evaluation of low back pain.   The pain developed gradually several years ago. It is 0-10/10 in severity has an aching and a burning quality and radiates into the left leg in a nonspecific distribution. The pain has been waxing and waning in severity. The pain tends to be maximal at no specific time, but waxes and wanes in severity throughout the day. The patient states the pain is aggravated by prolonged standing, walking long distances, and bending. It is alleviated by rest.   She also reports her legs feel like at times her legs will give out. She denies bowel or bladder dysfunction. The patient has no prior history of neck or back surgery.   RECENT INTERVENTIONS:  She has been previously treated with physical therapy and epidural steroids. The physical therapy was partially effective in relieving the pain. The epidural steroids were ineffective.   INFORMATION REVIEWED:  The following information was reviewed: radiology reports and images. MRI of the lumbar spine revealed grade II spondylolisthesis. The grade II spondylolisthesis involves the L5-S1 level. Thoracic MRI shows an exaggerated kyphosis of the thoracic spine with multilevel degenerative changes.       Past Medical History  Familial adenomatous polyposis; Kidney stones; Migraine         Past Surgical History  Bile duct reconstruction; Calcaneal  "osteotomy; Colon Resection; Cystoscopy with ureteroscopy with extraction of calculus using stone retrieval basket; Ileostomy reversal; Tubal ligation; Ureter stent placement         Medication List  Eliquis 5 mg oral tablet; sulfasalazine 500 mg oral tablet         Allergy List  iodine; Latex; PENICILLINS       Allergies Reconciled  Family Medical History  Familial Polyposis Coli; Brain Neoplasm, Malignant; Hypertension; Colon Cancer; Thyroid disorder         Social History  Tobacco (Former)         Review of Systems  · Constitutional  o Denies  o : chills, excessive sweating, fatigue, fever, sycope/passing out, weight gain, weight loss  · Eyes  o Denies  o : changes in vision, blurry vision, double vision  · HENT  o Denies  o : loss of hearing, ringing in the ears, ear aches, sore throat, nasal congestion, sinus pain, nose bleeds, seasonal allergies  · Cardiovascular  o Denies  o : blood clots, swollen legs, anemia, easy burising or bleeding, transfusions  · Respiratory  o Denies  o : shortness of breath, dry cough, productive cough, pneumonia, COPD  · Gastrointestinal  o Denies  o : difficulty swallowing, reflux  · Genitourinary  o Denies  o : incontinence  · Neurologic  o Denies  o : headache, seizure, stroke, tremor, loss of balance, falls, dizziness/vertigo, difficulty with sleep, numbness/tingling/paresthesia , difficulty with coordination, difficulty with dexterity, weakness  · Musculoskeletal  o Admits  o : low back pain  o Denies  o : neck stiffness/pain, swollen lymph nodes, muscle aches, joint pain, weakness, spasms, sciatica, pain radiating in arm, pain radiating in leg  · Endocrine  o Denies  o : diabetes, thyroid disorder  · Psychiatric  o Denies  o : anxiety, depression  · All Others Negative      Vitals  Date Time BP Position Site L\R Cuff Size HR RR TEMP (F) WT  HT  BMI kg/m2 BSA m2 O2 Sat FR L/min FiO2        12/17/2020 10:55 AM        96.7 244lbs 5oz 5'  8\" 37.15 2.31             Physical " Examination  · Constitutional  o Appearance  o : well-nourished, well developed, alert, in no acute distress, BMI 37  · Respiratory  o Respiratory Effort  o : breathing unlabored  · Cardiovascular  o Peripheral Vascular System  o :   § Extremities  § : no edema or cyanosis  · Skin and Subcutaneous Tissue  o Extremities  o :   § Right Lower Extremity  § : no lesions or areas of discoloration  § Left Lower Extremity  § : no lesions or areas of discoloration  o Back  o : no lesions or areas of discoloration  · Neurologic  o Mental Status Examination  o :   § Orientation  § : alert and oriented to time, person, place and events  o Motor Examination  o :   § RLE Strength  § : strength normal  § RLE Motor Function  § : tone normal, no atrophy, no abnormal movements noted  § LLE Strength  § : strength normal  § LLE Motor Function  § : tone normal, no atrophy, no abnormal movements noted  o Reflexes  o :   § RLE  § : knee and ankle reflexes tr/4, SLR negative  § LLE  § : knee and ankle reflexes tr/4, SLR negative  o Sensation  o :   § Light Touch  § : sensation intact to light touch in extremities  o Gait and Station  o :   § Gait Screening  § : normal gait, able to stand without difficulty  · Psychiatric  o Mood and Affect  o : mood normal, affect appropriate              Assessment  · Acquired spondylolisthesis , lumbar     738.4/M43.19  L5-S1      Plan  · Medications  o Medications have been Reconciled  o Transition of Care or Provider Policy  · Instructions  o Encouraged to follow-up with Primary Care Provider for preventative care.  o The ROS and the PFSH were reviewed at today's visit.  o She could potentially benefit from an L5-S1 fusion. We discussed the risks of the surgery. I am unsure of what surgery they are considering in the thoracic spine, but I would expect a lower success rate with that compared with the lumbar surgery.            Electronically Signed by: Pedro Forte MD -Author on December 17, 2020  12:15:48 PM

## 2021-05-14 VITALS — BODY MASS INDEX: 37.03 KG/M2 | TEMPERATURE: 96.7 F | WEIGHT: 244.31 LBS | HEIGHT: 68 IN

## 2021-05-15 VITALS
WEIGHT: 206 LBS | HEIGHT: 68 IN | DIASTOLIC BLOOD PRESSURE: 80 MMHG | SYSTOLIC BLOOD PRESSURE: 118 MMHG | BODY MASS INDEX: 31.22 KG/M2

## 2021-05-15 VITALS
HEIGHT: 68 IN | SYSTOLIC BLOOD PRESSURE: 120 MMHG | WEIGHT: 198.5 LBS | BODY MASS INDEX: 30.08 KG/M2 | DIASTOLIC BLOOD PRESSURE: 78 MMHG

## 2021-06-07 ENCOUNTER — OFFICE VISIT (OUTPATIENT)
Dept: NEUROSURGERY | Facility: CLINIC | Age: 56
End: 2021-06-07

## 2021-06-07 DIAGNOSIS — Q76.2 CONGENITAL SPONDYLOLYSIS, LUMBOSACRAL REGION: Primary | ICD-10-CM

## 2021-06-07 DIAGNOSIS — G89.29 CHRONIC LEFT-SIDED THORACIC BACK PAIN: ICD-10-CM

## 2021-06-07 DIAGNOSIS — M51.36 DDD (DEGENERATIVE DISC DISEASE), LUMBAR: ICD-10-CM

## 2021-06-07 DIAGNOSIS — M54.6 CHRONIC LEFT-SIDED THORACIC BACK PAIN: ICD-10-CM

## 2021-06-07 PROCEDURE — 99442 PR PHYS/QHP TELEPHONE EVALUATION 11-20 MIN: CPT | Performed by: NEUROLOGICAL SURGERY

## 2021-06-07 NOTE — PROGRESS NOTES
Subjective   Patient ID: Dorita Narayan is a 55 y.o. female is here today for follow-up.  Patient was last seen 3/1/21 for congenital spondylolysis lumbosacral region.    You have chosen to receive care through a telephone visit. Do you consent to use a telephone visit for your medical care today? Yes    Patient reports today with back pain.  She states she had surgery 4/12/21.    This was a televisit from my office lasting 11 minutes.  The patient was at home.  She got her surgery from Dr. Aldana and Dr. Lowe about 2 months ago.  It is a slow recovery and it was complicated by the fact that she developed a pulmonary embolus and is now on blood thinners for life.  This occurred about 3 weeks after the surgery.  But it slow-growing.  She has not yet started formal physical therapy.  We will keep it open-ended since she is following up with the [place] [facility].  I was simply checking up on her.      History of Present Illness    The following portions of the patient's history were reviewed and updated as appropriate: allergies, current medications, past family history, past medical history, past social history, past surgical history and problem list.    Review of Systems        Objective     There were no vitals filed for this visit.  There is no height or weight on file to calculate BMI.      Physical Exam   Deferred  Neurologic Exam   Deferred        Assessment/Plan   Independent Review of Radiographic Studies:      I personalI reviewed the plain x-rays done on 10/29/2020 as well as the MRI of the thoracic and the lumbar spine.  She has a grade 3 isthmic spondylolisthesis with a pars defect in the 3 cm slip.  There is nerve compression obvious at L5-S1.  There is some degenerative disc disease and some facet arthropathy in the thoracic spine but no significant cord or root compression.  Agree with the report.ly reviewed the images from the following studies.        Medical Decision Making:      We will keep it  open-ended.  Obviously she has a long way to go but I do think with time she will get there.      Diagnoses and all orders for this visit:    1. Congenital spondylolysis, lumbosacral region (Primary)    2. DDD (degenerative disc disease), lumbar    3. Chronic left-sided thoracic back pain      Return if symptoms worsen or fail to improve.

## 2021-08-11 NOTE — H&P
History & Physical       Patient: Dorita Narayan    Date of Admission: 7/20/2020  5:36 AM    YOB: 1965    Medical Record Number: [id number]    Attending Physician: Chetan Rodriguez MD        Chief Complaints: Right heel pain      History of Present Illness: 54 y.o. female presents with right calcaneal exostosis and partial Achilles tear. Onset of symptoms was gradual starting unknown years ago.  Symptoms are associated with a limp.  Symptoms are aggravated by shoes.   Symptoms improve with nothing. Patient is now being admitted to the services of Chetan Rodriguez MD for further evaluation and treatment.     Allergies   Allergen Reactions   • Latex Other (See Comments)     SKIN BLISTERS   • Penicillins Hives   • Iodine Rash       Medications:     Home Medications:  No current facility-administered medications on file prior to encounter.      No current outpatient medications on file prior to encounter.     Current Medications:  Scheduled Meds:  bupivacaine liposome 10 mL Infiltration Once   clindamycin 600 mg Intravenous Q8H   sodium chloride 3 mL Intravenous Q12H     Continuous Infusions:  lactated ringers 9 mL/hr Last Rate: 9 mL/hr (07/20/20 0624)     PRN Meds:.fentanyl  •  lidocaine PF 1%  •  midazolam  •  sodium chloride       Past Medical History:   Diagnosis Date   • Achilles tendonitis     RIGHT    • Edema     BLE AT TIMES TAKES LASIX   • Familial adenomatous polyposis    • History of cellulitis 2019    LEFT LEG    • History of kidney stones    • History of migraine         Past Surgical History:   Procedure Laterality Date   • COLONOSCOPY     • CREATION / REVISION OF ILEOSTOMY / JEJUNOSTOMY     • KIDNEY STONE SURGERY      X2   • RESECTION SMALL BOWEL / CLOSURE ILEOSTOMY     • TUBAL ABDOMINAL LIGATION          Social History     Occupational History   • Not on file   Tobacco Use   • Smoking status: Former Smoker     Packs/day: 0.25     Years: 8.00     Pack years: 2.00     Types: Cigarettes  Patient left a message to let you know that he had a heart event yesterday and is currently admitted at [facility]. He said they stopped all his medications and started on a new blood thinner. He stated they were planning on stress test and ECHO today.     Last attempt to quit: 2020     Years since quittin.0   • Smokeless tobacco: Never Used   Substance and Sexual Activity   • Alcohol use: Defer   • Drug use: Never   • Sexual activity: Defer    Social History     Social History Narrative   • Not on file        Family History   Problem Relation Age of Onset   • Malig Hyperthermia Neg Hx          Review of Systems:   HEENT: Patient denies any headaches, vision changes, change in hearing, or tinnitus, Patient denies any rhinorrhea,epistaxis, sinus pain, mouth or dental problems, sore throat or hoarseness, or dysphagia  Pulmonary: Patient denies any cough, congestion, SOA, or wheezing  Cardiovascular: Patient denies any chest pain, dyspnea, palpitations, weakness, intolerance of exercise, varicosities, swelling of extremities, known murmur  Gastrointestinal:  Patient denies nausea, vomiting, diarrhea, constipation, loss  of appetite, change in appetite, dysphagia, gas, heartburn, melena, change in bowel habits, use of laxatives or other drugs to alter the function of the gastrointestinal tract.  Genital/Urinary: Patient denies dysuria, change in color of urine, change in frequency of urination, pain with urgency, incontinence, retention, or nocturia.  Musculoskeletal: With the exception of the involved extremity, the patient denies increased warmth; redness; or swelling of joints; limitation of function; deformity; crepitation: pain in a joint or an extremity, the neck, or the back, especially with movement.  Neurological: Patient denies dizziness, tremor, ataxia, difficulty in speaking, change in speech, paresthesia, loss of sensation, seizures, syncope, changes in memory.  Endocrine system: Patient denies tremors, palpitations, intolerance of heat or cold, polyuria, polydipsia, polyphagia, diaphoresis, exophthalmos, or goiter.  Psychological: Patient denies thoughts/plans or harming self or other; depression,  insomnia, night terrors, remi, memory loss,  disorientation.  Skin: Patient denies any bruising, rashes, discoloration, pruritus, wounds, or changes in the hair or nails.  Hematopoietic: Patient denies history of spontaneous or excessive bleeding, epistaxis, hematuria, melena, fatigue, enlarged or tender lymph nodes, pallor, history of anemia.    Physical Exam: 54 y.o. female  General Appearance:    Alert, cooperative, in no acute distress                    Vitals:    07/20/20 0600 07/20/20 0643   BP: 123/75 102/62   BP Location: Left arm Left arm   Patient Position: Sitting Lying   Pulse: 69 68   Resp: 20 16   Temp: 98.4 °F (36.9 °C)    TempSrc: Oral    SpO2: 99% 100%   Weight: 105 kg (231 lb 14.8 oz)         Head:    Normocephalic, without obvious abnormality, atraumatic   Eyes:            Lids and lashes normal, conjunctivae and sclerae normal, no   icterus, no pallor, corneas clear, PERRLA   Ears:    Ears appear intact with no abnormalities noted   Throat:   No oral lesions, no thrush, oral mucosa moist   Neck:   No adenopathy, supple, trachea midline, no thyromegaly, no   carotid bruit, no JVD   Back:     No kyphosis present, no scoliosis present, no skin lesions,      erythema or scars, no tenderness to percussion or                   palpation,   range of motion normal   Lungs:     Clear to auscultation,respirations regular, even and                  unlabored    Heart:    Regular rhythm and normal rate, normal S1 and S2, no            murmur, no gallop, no rub, no click   Chest Wall:    No abnormalities observed   Abdomen:     Normal bowel sounds, no masses, no organomegaly, soft        non-tender, non-distended, no guarding, no rebound                tenderness   Rectal:     Deferred   Extremities:   Tenderness over right posterior heel and distal Achilles  . Moves all extremities well, no edema,   no cyanosis, no redness   Pulses:   Pulses palpable and equal bilaterally   Skin:   No bleeding, bruising or rash   Lymph nodes:   No palpable adenopathy    Neurologic:   Cranial nerves 2 - 12 grossly intact, sensation intact, DTR       present and equal bilaterally           Diagnostic Tests:  Results from last 7 days   Lab Units 07/17/20  0948   WBC 10*3/mm3 6.93   HEMOGLOBIN g/dL 13.7   HEMATOCRIT % 41.5   PLATELETS 10*3/mm3 343     Results from last 7 days   Lab Units 07/17/20  0948   SODIUM mmol/L 137   POTASSIUM mmol/L 4.4   CHLORIDE mmol/L 105   CO2 mmol/L 23.0   BUN mg/dL 10   CREATININE mg/dL 0.69   GLUCOSE mg/dL 91   CALCIUM mg/dL 9.3         No results found for: CRYSTAL]  No results found for: CULTURE]  No results found for: URICACID]    No results found.    Assessment:  There is no problem list on file for this patient.          Plan:  The patient voiced understanding of the risks, benefits, and alternative forms of treatment that were discussed. All questions were answered and the patient consents to proceed with the procedure as planned.        Discharge Plan: today to home      Date: 7/20/2020    Chetan Rodriguez MD    EMR Dragon/Transcription disclaimer:   Much of this encounter note is an electronic transcription/translation of spoken language to printed text. The electronic translation of spoken language may permit erroneous, or at times, nonsensical words or phrases to be inadvertently transcribed; Although I have reviewed the note for such errors, some may still exist.

## 2022-01-21 NOTE — ED TRIAGE NOTES
Pt had a follow up appointment today after having right foot surgery with Dr Rodriguez with [facility]. Pt was c/o pain and swelling in the right foot. Pt was sent for doppler study of the right leg and was sent to ER for abnormal results. Pt masked and placed in waiting room. Pt denies SOA or chest pain. NAD.    no

## 2022-03-04 RX ORDER — METHOCARBAMOL 750 MG/1
750 TABLET, FILM COATED ORAL 2 TIMES DAILY PRN
COMMUNITY
Start: 2021-10-01

## 2022-03-07 ENCOUNTER — OFFICE VISIT (OUTPATIENT)
Dept: GASTROENTEROLOGY | Facility: CLINIC | Age: 57
End: 2022-03-07

## 2022-03-07 ENCOUNTER — PREP FOR SURGERY (OUTPATIENT)
Dept: OTHER | Facility: HOSPITAL | Age: 57
End: 2022-03-07

## 2022-03-07 ENCOUNTER — TELEPHONE (OUTPATIENT)
Dept: GASTROENTEROLOGY | Facility: CLINIC | Age: 57
End: 2022-03-07

## 2022-03-07 VITALS — HEIGHT: 68 IN | TEMPERATURE: 96.6 F | WEIGHT: 252 LBS | BODY MASS INDEX: 38.19 KG/M2

## 2022-03-07 DIAGNOSIS — D12.6 FAP (FAMILIAL ADENOMATOUS POLYPOSIS): Primary | ICD-10-CM

## 2022-03-07 DIAGNOSIS — Z79.01 ANTICOAGULATED: ICD-10-CM

## 2022-03-07 DIAGNOSIS — D13.5 AMPULLARY ADENOMA: ICD-10-CM

## 2022-03-07 PROBLEM — D13.91 FAP (FAMILIAL ADENOMATOUS POLYPOSIS): Status: ACTIVE | Noted: 2022-03-07

## 2022-03-07 PROCEDURE — 99204 OFFICE O/P NEW MOD 45 MIN: CPT | Performed by: INTERNAL MEDICINE

## 2022-03-07 RX ORDER — MELATONIN
2000 DAILY
COMMUNITY

## 2022-03-07 NOTE — PROGRESS NOTES
Subjective   Chief Complaint   Patient presents with   • Heartburn   • Difficulty Swallowing       Dorita Narayan is a  56 y.o. female here for   All problems are new to me.  She has a history of familial polyposis on her father's side.  Her mother also had a colon tumor.    occasional indigestion treats with TUMs    She has a h/o DVTs postop - she takes eliquis now chronically.    She has a FH polyps, h/o familial polyposis.  She has had a total colectomy with J-pouch 1989 at [facility].    H/o ampullary adenoma.  Last evaluation 1/19.  She has had previous capsules due to duodenal polyps.      HPI  Past Medical History:   Diagnosis Date   • Achilles tendonitis     RIGHT    • Colon polyp May 1989   • Edema     BLE AT TIMES TAKES LASIX   • Familial adenomatous polyposis    • History of cellulitis 2019    LEFT LEG    • History of kidney stones    • History of migraine      Past Surgical History:   Procedure Laterality Date   • ACHILLES TENDON SURGERY Right 07/20/2020    Procedure: RIGHT CALCANEAL EXOSTECTOMY AND RETROCALCANEAL BURSECTOMY, SECONDARY ACHILLES TENDON REPAIR RECONSTRUCTION ACHILLES TENDON REPAIR;  Surgeon: Chetan Rodriguez MD;  Location: [facility];  Service: Orthopedics;  Laterality: Right;   • COLONOSCOPY     • CREATION / REVISION OF ILEOSTOMY / JEJUNOSTOMY     • FLEXIBLE SIGMOIDOSCOPY  ???   • KIDNEY STONE SURGERY      X2   • RESECTION SMALL BOWEL / CLOSURE ILEOSTOMY     • TUBAL ABDOMINAL LIGATION     • UPPER GASTROINTESTINAL ENDOSCOPY  1989-current       Current Outpatient Medications:   •  Acetaminophen (TYLENOL EXTRA STRENGTH PO), Take 1,000 mg by mouth As Needed., Disp: , Rfl:   •  Apixaban (Eliquis DVT/PE Starter Pack) tablet, Take two 5 mg tablets by mouth every 12 hours for 7 days. Followed by one 5 mg tablet every 12 hours. (Dispense starter pack if available), Disp: 74 tablet, Rfl: 0  •  cholecalciferol (VITAMIN D3) 25 MCG (1000 UT) tablet, Take 2,000 Units by mouth Daily., Disp: , Rfl:   •   methocarbamol (ROBAXIN) 750 MG tablet, Take 750 mg by mouth 2 (Two) Times a Day As Needed., Disp: , Rfl:   •  sulfaSALAzine (AZULFIDINE) 500 MG tablet, , Disp: , Rfl:   PRN Meds:.  Allergies   Allergen Reactions   • Iodine Rash   • Latex Other (See Comments)     SKIN BLISTERS   • Penicillins Hives     Social History     Socioeconomic History   • Marital status:    Tobacco Use   • Smoking status: Former Smoker     Packs/day: 0.00     Years: 8.00     Pack years: 0.00     Types: Cigarettes     Quit date: 2020     Years since quittin.6   • Smokeless tobacco: Never Used   • Tobacco comment: Started in late 20's quit off & on for 10yrs   Substance and Sexual Activity   • Alcohol use: Not Currently     Comment: Once a yr   • Drug use: Never   • Sexual activity: Not Currently     Partners: Male     Comment: Menapause     Family History   Problem Relation Age of Onset   • Malig Hyperthermia Neg Hx    • Colon cancer Neg Hx              Review of Systems   Constitutional: Negative for appetite change and unexpected weight change.   Gastrointestinal: Negative for abdominal pain and blood in stool.   Musculoskeletal: Positive for back pain and gait problem.     Vitals:    22 1147   Temp: 96.6 °F (35.9 °C)         22  1147   Weight: 114 kg (252 lb)       Objective   Physical Exam  Constitutional:       Appearance: Normal appearance. She is well-developed.   HENT:      Head: Normocephalic and atraumatic.   Eyes:      General: No scleral icterus.     Conjunctiva/sclera: Conjunctivae normal.   Pulmonary:      Effort: Pulmonary effort is normal.   Abdominal:      General: There is no distension.      Palpations: Abdomen is soft.   Musculoskeletal:      Cervical back: Normal range of motion and neck supple.   Skin:     General: Skin is warm and dry.   Neurological:      Mental Status: She is alert.   Psychiatric:         Mood and Affect: Mood normal.         Behavior: Behavior normal.       No  radiology results for the last 7 days    Assessment/Plan   Diagnoses and all orders for this visit:    FAP (familial adenomatous polyposis)    Ampullary adenoma    Anticoagulated    Other orders  -     methocarbamol (ROBAXIN) 750 MG tablet; Take 750 mg by mouth 2 (Two) Times a Day As Needed.  -     cholecalciferol (VITAMIN D3) 25 MCG (1000 UT) tablet; Take 2,000 Units by mouth Daily.      Plan:  · Plan for egd with side viewing scope and flex sig due to history of FAP, ampullary adenoma  · Consider capsule endoscopy depending on updated evaluation

## 2022-03-07 NOTE — TELEPHONE ENCOUNTER
YESSENIA Kevin for EGD on 03/15/2022  arrive at 7am  . Gave Prep instructions in office.    OK per LB    Advised PT  that [facility] will call with final arrival time  24 hrs before procedure. If they do not get a phone call, arrival time will stay the same as given on instructions

## 2022-03-12 ENCOUNTER — LAB (OUTPATIENT)
Dept: LAB | Facility: HOSPITAL | Age: 57
End: 2022-03-12

## 2022-03-12 DIAGNOSIS — D12.6 FAP (FAMILIAL ADENOMATOUS POLYPOSIS): ICD-10-CM

## 2022-03-12 DIAGNOSIS — Z79.01 ANTICOAGULATED: ICD-10-CM

## 2022-03-12 LAB — SARS-COV-2 ORF1AB RESP QL NAA+PROBE: NOT DETECTED

## 2022-03-12 PROCEDURE — U0004 COV-19 TEST NON-CDC HGH THRU: HCPCS

## 2022-03-12 PROCEDURE — C9803 HOPD COVID-19 SPEC COLLECT: HCPCS

## 2022-03-15 ENCOUNTER — ANESTHESIA EVENT (OUTPATIENT)
Dept: GASTROENTEROLOGY | Facility: HOSPITAL | Age: 57
End: 2022-03-15

## 2022-03-15 ENCOUNTER — HOSPITAL ENCOUNTER (OUTPATIENT)
Facility: HOSPITAL | Age: 57
Setting detail: HOSPITAL OUTPATIENT SURGERY
Discharge: HOME | End: 2022-03-15
Attending: INTERNAL MEDICINE | Admitting: INTERNAL MEDICINE

## 2022-03-15 ENCOUNTER — ANESTHESIA (OUTPATIENT)
Dept: GASTROENTEROLOGY | Facility: HOSPITAL | Age: 57
End: 2022-03-15

## 2022-03-15 VITALS
BODY MASS INDEX: 37.21 KG/M2 | WEIGHT: 245.5 LBS | HEART RATE: 79 BPM | OXYGEN SATURATION: 98 % | RESPIRATION RATE: 16 BRPM | SYSTOLIC BLOOD PRESSURE: 113 MMHG | HEIGHT: 68 IN | DIASTOLIC BLOOD PRESSURE: 54 MMHG

## 2022-03-15 DIAGNOSIS — Z79.01 ANTICOAGULATED: ICD-10-CM

## 2022-03-15 DIAGNOSIS — D12.6 FAP (FAMILIAL ADENOMATOUS POLYPOSIS): ICD-10-CM

## 2022-03-15 PROCEDURE — 43236 UPPR GI SCOPE W/SUBMUC INJ: CPT | Performed by: INTERNAL MEDICINE

## 2022-03-15 PROCEDURE — 45385 COLONOSCOPY W/LESION REMOVAL: CPT | Performed by: INTERNAL MEDICINE

## 2022-03-15 PROCEDURE — 25010000002 PHENYLEPHRINE 10 MG/ML SOLUTION: Performed by: ANESTHESIOLOGY

## 2022-03-15 PROCEDURE — 88305 TISSUE EXAM BY PATHOLOGIST: CPT | Performed by: INTERNAL MEDICINE

## 2022-03-15 PROCEDURE — S0260 H&P FOR SURGERY: HCPCS | Performed by: INTERNAL MEDICINE

## 2022-03-15 PROCEDURE — 25010000002 PROPOFOL 10 MG/ML EMULSION: Performed by: ANESTHESIOLOGY

## 2022-03-15 PROCEDURE — 43239 EGD BIOPSY SINGLE/MULTIPLE: CPT | Performed by: INTERNAL MEDICINE

## 2022-03-15 RX ORDER — OMEPRAZOLE 20 MG/1
20 CAPSULE, DELAYED RELEASE ORAL 2 TIMES DAILY
Qty: 60 CAPSULE | Refills: 2 | Status: SHIPPED | OUTPATIENT
Start: 2022-03-15 | End: 2022-03-17

## 2022-03-15 RX ORDER — PROPOFOL 10 MG/ML
VIAL (ML) INTRAVENOUS AS NEEDED
Status: DISCONTINUED | OUTPATIENT
Start: 2022-03-15 | End: 2022-03-15 | Stop reason: SURG

## 2022-03-15 RX ORDER — SODIUM CHLORIDE, SODIUM LACTATE, POTASSIUM CHLORIDE, CALCIUM CHLORIDE 600; 310; 30; 20 MG/100ML; MG/100ML; MG/100ML; MG/100ML
30 INJECTION, SOLUTION INTRAVENOUS CONTINUOUS PRN
Status: DISCONTINUED | OUTPATIENT
Start: 2022-03-15 | End: 2022-03-15 | Stop reason: HOSPADM

## 2022-03-15 RX ORDER — GLYCOPYRROLATE 0.2 MG/ML
INJECTION INTRAMUSCULAR; INTRAVENOUS AS NEEDED
Status: DISCONTINUED | OUTPATIENT
Start: 2022-03-15 | End: 2022-03-15 | Stop reason: SURG

## 2022-03-15 RX ORDER — LIDOCAINE HYDROCHLORIDE 20 MG/ML
INJECTION, SOLUTION INFILTRATION; PERINEURAL AS NEEDED
Status: DISCONTINUED | OUTPATIENT
Start: 2022-03-15 | End: 2022-03-15 | Stop reason: SURG

## 2022-03-15 RX ORDER — PHENYLEPHRINE HYDROCHLORIDE 10 MG/ML
INJECTION INTRAVENOUS AS NEEDED
Status: DISCONTINUED | OUTPATIENT
Start: 2022-03-15 | End: 2022-03-15 | Stop reason: SURG

## 2022-03-15 RX ORDER — PROPOFOL 10 MG/ML
VIAL (ML) INTRAVENOUS CONTINUOUS PRN
Status: DISCONTINUED | OUTPATIENT
Start: 2022-03-15 | End: 2022-03-15 | Stop reason: SURG

## 2022-03-15 RX ADMIN — PROPOFOL 150 MG: 10 INJECTION, EMULSION INTRAVENOUS at 08:11

## 2022-03-15 RX ADMIN — Medication 200 MCG/KG/MIN: at 08:11

## 2022-03-15 RX ADMIN — LIDOCAINE HYDROCHLORIDE 60 MG: 20 INJECTION, SOLUTION INFILTRATION; PERINEURAL at 08:11

## 2022-03-15 RX ADMIN — SODIUM CHLORIDE, POTASSIUM CHLORIDE, SODIUM LACTATE AND CALCIUM CHLORIDE 30 ML/HR: 600; 310; 30; 20 INJECTION, SOLUTION INTRAVENOUS at 07:39

## 2022-03-15 RX ADMIN — PHENYLEPHRINE HYDROCHLORIDE 150 MCG: 10 INJECTION, SOLUTION INTRAVENOUS at 08:16

## 2022-03-15 RX ADMIN — GLYCOPYRROLATE 0.2 MG: 0.2 INJECTION INTRAMUSCULAR; INTRAVENOUS at 08:07

## 2022-03-15 RX ADMIN — PHENYLEPHRINE HYDROCHLORIDE 50 MCG: 10 INJECTION, SOLUTION INTRAVENOUS at 08:27

## 2022-03-15 NOTE — ANESTHESIA POSTPROCEDURE EVALUATION
"Patient: Dorita Narayan    Procedure Summary     Date: 03/15/22 Room / [facility]    Anesthesia Start: 0803 Anesthesia Stop: 0845    Procedures:       SIGMOIDOSCOPY FLEXIBLE to anastamosis with cold polypectomies (N/A )      ESOPHAGOGASTRODUODENOSCOPY with saline lift and duodenum polypectomy and cold bx (N/A Esophagus) Diagnosis:       FAP (familial adenomatous polyposis)      Anticoagulated      (FAP (familial adenomatous polyposis) [D12.6])      (Anticoagulated [Z79.01])    Surgeons: Marylou Pillai MD Provider: Caio Couch DO    Anesthesia Type: MAC ASA Status: 2          Anesthesia Type: MAC    Vitals  Vitals Value Taken Time   /79 03/15/22 0846   Temp     Pulse 83 03/15/22 0855   Resp 16 03/15/22 0855   SpO2 99 % 03/15/22 0855           Post Anesthesia Care and Evaluation    Patient location during evaluation: bedside  Patient participation: waiting for patient participation  Level of consciousness: sleepy but conscious and responsive to verbal stimuli  Pain management: adequate  Airway patency: patent  Anesthetic complications: No anesthetic complications  PONV Status: NA  Cardiovascular status: acceptable and hemodynamically stable  Respiratory status: acceptable, spontaneous ventilation and nonlabored ventilation  Hydration status: acceptable    Comments: /79   Pulse 83   Resp 16   Ht 172.7 cm (68\")   Wt 111 kg (245 lb 8 oz)   SpO2 99%   BMI 37.33 kg/m²       "

## 2022-03-15 NOTE — ANESTHESIA PREPROCEDURE EVALUATION
Anesthesia Evaluation     Patient summary reviewed   no history of anesthetic complications:  NPO Solid Status: > 8 hours  NPO Liquid Status: > 2 hours           Airway   Mallampati: II  TM distance: >3 FB  Neck ROM: full  no difficulty expected  Dental - normal exam     Pulmonary     breath sounds clear to auscultation  (+) a smoker Former,   (-) shortness of breath, recent URI  Cardiovascular     PT is on anticoagulation therapy  Rhythm: regular  Rate: normal    (+) DVT (h/o) resolved,   (-) past MI, dysrhythmias, angina      Neuro/Psych  (-) seizures, CVA  GI/Hepatic/Renal/Endo    (+) morbid obesity,    (-) liver disease, no renal disease, diabetes    ROS Comment: FAP    Musculoskeletal     (+) back pain,   Abdominal    Substance History      OB/GYN          Other   blood dyscrasia (Hgb 9) anemia,     ROS/Med Hx Other: Last Eliquis 3/12                  Anesthesia Plan    ASA 2     MAC       Anesthetic plan, all risks, benefits, and alternatives have been provided, discussed and informed consent has been obtained with: patient.

## 2022-03-15 NOTE — H&P
[facility], [place], operated by [facility]  Pre Procedure History & Physical    Chief Complaint:   fap    Subjective     HPI:   Dorita Narayan is a  56 y.o. female here for   All problems are new to me.  She has a history of familial polyposis on her father's side.  Her mother also had a colon tumor.     occasional indigestion treats with TUMs     She has a h/o DVTs postop - she takes eliquis now chronically.     She has a FH polyps, h/o familial polyposis.  She has had a total colectomy with J-pouch 1989 at [facility].     H/o ampullary adenoma.  Last evaluation 1/19.  She has had previous capsules due to duodenal polyps.         Past Medical History:   Past Medical History:   Diagnosis Date   • Achilles tendonitis     RIGHT    • Anemia    • Arthritis     osteoarthritis, RA   • Bronchitis    • Cancer (HCC)     family history of colon cancer   • Colon polyp May 1989   • DVT (deep venous thrombosis) (HCC)     bilateral ankle, hip, lung   • Edema     BLE AT TIMES TAKES LASIX   • Familial adenomatous polyposis    • Familial polyposis    • History of cellulitis 2019    LEFT LEG    • History of kidney stones    • History of migraine    • Pneumonia    • Spondylisthesis        Past Surgical History:  Past Surgical History:   Procedure Laterality Date   • ACHILLES TENDON SURGERY Right 07/20/2020    Procedure: RIGHT CALCANEAL EXOSTECTOMY AND RETROCALCANEAL BURSECTOMY, SECONDARY ACHILLES TENDON REPAIR RECONSTRUCTION ACHILLES TENDON REPAIR;  Surgeon: Chetan Rodriguez MD;  [place] [facility] [place] [facility] [place]  Service: Orthopedics;  Laterality: Right;   • BILE DUCT STENT PLACEMENT     • COLONOSCOPY     • CREATION / REVISION OF ILEOSTOMY / JEJUNOSTOMY     • FLEXIBLE SIGMOIDOSCOPY  ???   • KIDNEY STONE SURGERY      X2   • RESECTION SMALL BOWEL / CLOSURE ILEOSTOMY     • SPINE SURGERY      lumbar laminectomy decompression   • TUBAL ABDOMINAL LIGATION     • UPPER GASTROINTESTINAL ENDOSCOPY  1989-current       Family History:  Family History   Problem  "Relation Age of Onset   • Colon cancer Mother    • Malig Hyperthermia Neg Hx        Social History:   reports that she quit smoking about 20 months ago. Her smoking use included cigarettes. She smoked 0.00 packs per day for 8.00 years. She has never used smokeless tobacco. She reports previous alcohol use. She reports that she does not use drugs.    Medications:   Medications Prior to Admission   Medication Sig Dispense Refill Last Dose   • Acetaminophen (TYLENOL EXTRA STRENGTH PO) Take 1,000 mg by mouth As Needed.   Past Week at Unknown time   • Apixaban (Eliquis DVT/PE Starter Pack) tablet Take two 5 mg tablets by mouth every 12 hours for 7 days. Followed by one 5 mg tablet every 12 hours. (Dispense starter pack if available) 74 tablet 0 3/12/2022   • cholecalciferol (VITAMIN D3) 25 MCG (1000 UT) tablet Take 2,000 Units by mouth Daily.   Past Week at Unknown time   • methocarbamol (ROBAXIN) 750 MG tablet Take 750 mg by mouth 2 (Two) Times a Day As Needed.   Past Week at Unknown time   • sulfaSALAzine (AZULFIDINE) 500 MG tablet    Past Week at Unknown time       Allergies:  Iodine, Latex, and Penicillins    ROS:    Pertinent items are noted in HPI, all other systems reviewed and negative     Objective     Blood pressure 124/75, pulse 79, resp. rate 14, height 172.7 cm (68\"), weight 111 kg (245 lb 8 oz), SpO2 96 %.    Physical Exam   Constitutional: Pt is oriented to person, place, and time and well-developed, well-nourished, and in no distress.   Mouth/Throat: Oropharynx is clear and moist.   Neck: Normal range of motion.   Cardiovascular: Normal rate, regular rhythm and normal heart sounds.    Pulmonary/Chest: Effort normal and breath sounds normal.   Abdominal: Soft. Nontender  Skin: Skin is warm and dry.   Psychiatric: Mood, memory, affect and judgment normal.     Assessment/Plan     Diagnosis:  fap    Anticipated Surgical Procedure:  Egd/flex sig    The risks, benefits, and alternatives of this procedure have " been discussed with the patient or the responsible party- the patient understands and agrees to proceed.

## 2022-03-16 ENCOUNTER — TELEPHONE (OUTPATIENT)
Dept: GASTROENTEROLOGY | Facility: CLINIC | Age: 57
End: 2022-03-16

## 2022-03-16 NOTE — TELEPHONE ENCOUNTER
----- Message from Alex Mac sent at 3/16/2022 12:23 PM EDT -----  Regarding: meds  Contact: [phone]  Pt states that since she lost the majority of her colon at an early age, she cannot take capsules. The omeprazole that was sent to the drug store was a capsule.  Can it be in another form ?  If the doctor feels it is necessary,  if not she can just take tums.  [facility] [phone]

## 2022-03-17 RX ORDER — PANTOPRAZOLE SODIUM 20 MG/1
20 TABLET, DELAYED RELEASE ORAL 2 TIMES DAILY
Qty: 180 TABLET | Refills: 1 | Status: SHIPPED | OUTPATIENT
Start: 2022-03-17 | End: 2022-11-09 | Stop reason: SDUPTHER

## 2022-03-17 NOTE — TELEPHONE ENCOUNTER
Pantoprazole sent to pharmacy-given esophagitis visualized on her EGD, regular acid suppression is necessary.  Recommend taking this twice a day for 3 months then once a day thereafter.  Tums will not be sufficient for this.

## 2022-03-18 ENCOUNTER — TELEPHONE (OUTPATIENT)
Dept: GASTROENTEROLOGY | Facility: CLINIC | Age: 57
End: 2022-03-18

## 2022-03-18 LAB
LAB AP CASE REPORT: NORMAL
PATH REPORT.FINAL DX SPEC: NORMAL
PATH REPORT.GROSS SPEC: NORMAL

## 2022-03-18 RX ORDER — PANTOPRAZOLE SODIUM 20 MG/1
20 TABLET, DELAYED RELEASE ORAL 2 TIMES DAILY
Qty: 180 TABLET | Refills: 1 | Status: SHIPPED | OUTPATIENT
Start: 2022-03-18 | End: 2022-04-19

## 2022-03-18 NOTE — TELEPHONE ENCOUNTER
----- Message from Alex Mac sent at 3/18/2022  9:12 AM EDT -----  Regarding: Medications  Could you please send new script to pt's pharmacy which is, [facility] in [facility], [phone]

## 2022-03-18 NOTE — TELEPHONE ENCOUNTER
See pantoprazole rx of 3/17.  Re-escribed to cyndee as requested.     Call to pt.  Advise of above.  Verb understanding.

## 2022-03-21 ENCOUNTER — TELEPHONE (OUTPATIENT)
Dept: GASTROENTEROLOGY | Facility: CLINIC | Age: 57
End: 2022-03-21

## 2022-03-21 NOTE — TELEPHONE ENCOUNTER
Called pt and advised that she can have her [employer] run her pantoprazole script thru [facility] and her cost for a 90 day supply should be approx $19.  Pt verb understanding and states she will do this. Advised pt to let us know if she has any problems. Verb understanding.

## 2022-03-21 NOTE — TELEPHONE ENCOUNTER
----- Message from Alex Elena sent at 3/21/2022  9:32 AM EDT -----  Contact: [phone]  Pt is calling and says that the [facility] in [place] cannot run her insurance for her medication. Because it was sent over to [facility] pharmacy and the one that was sent over to [facility] has to be cancelled first before [facility] can give her the medication from them. So please cancel the medication at [facility]

## 2022-03-22 NOTE — PROGRESS NOTES
The duodenal polyp showed adenomatous change.  Follow-up egd in 2 years is advised (spigelman class II).  The gastric polyps were benign.     Based on her exam and lack of advanced small bowel polyps at this time, a capsule sudy is not warranted based on current recommendations    The ileal polyps were adenomas - rec repeat ileoscopy in 1 year    Cont bid ppi x 3 months due to visualized esophagitis, then once daily thereafter    Office f/u in 6-8 weeks

## 2022-03-23 ENCOUNTER — TELEPHONE (OUTPATIENT)
Dept: GASTROENTEROLOGY | Facility: CLINIC | Age: 57
End: 2022-03-23

## 2022-03-23 NOTE — TELEPHONE ENCOUNTER
----- Message from Marylou Pillai MD sent at 3/22/2022 12:21 PM EDT -----  The duodenal polyp showed adenomatous change.  Follow-up egd in 2 years is advised (spigelman class II).  The gastric polyps were benign.     Based on her exam and lack of advanced small bowel polyps at this time, a capsule sudy is not warranted based on current recommendations    The ileal polyps were adenomas - rec repeat ileoscopy in 1 year    Cont bid ppi x 3 months due to visualized esophagitis, then once daily thereafter    Office f/u in 6-8 weeks

## 2022-03-23 NOTE — TELEPHONE ENCOUNTER
EGD recall placed for 3/15/24    Ileoscopy recall placed for 3/15/23    Patient notified of results and recommendations and verbalized understanding  Pt will call back to schedule  Thanks

## 2022-04-19 ENCOUNTER — OFFICE VISIT (OUTPATIENT)
Dept: GASTROENTEROLOGY | Facility: CLINIC | Age: 57
End: 2022-04-19

## 2022-04-19 VITALS
HEIGHT: 68 IN | OXYGEN SATURATION: 95 % | SYSTOLIC BLOOD PRESSURE: 111 MMHG | TEMPERATURE: 96.3 F | WEIGHT: 256.4 LBS | BODY MASS INDEX: 38.86 KG/M2 | DIASTOLIC BLOOD PRESSURE: 68 MMHG | HEART RATE: 76 BPM

## 2022-04-19 DIAGNOSIS — D13.2 ADENOMATOUS DUODENAL POLYP: ICD-10-CM

## 2022-04-19 DIAGNOSIS — D12.6 FAP (FAMILIAL ADENOMATOUS POLYPOSIS): Primary | ICD-10-CM

## 2022-04-19 DIAGNOSIS — K21.00 GASTROESOPHAGEAL REFLUX DISEASE WITH ESOPHAGITIS WITHOUT HEMORRHAGE: ICD-10-CM

## 2022-04-19 PROCEDURE — 99215 OFFICE O/P EST HI 40 MIN: CPT | Performed by: PHYSICIAN ASSISTANT

## 2022-04-19 NOTE — PROGRESS NOTES
Chief Complaint  Bloated    Subjective        History of Present Illness  Dorita Narayan is a  56 y.o. female patient of Dr. Pillai, new to me today, here for follow-up from recent endoscopies.  She has a history of familial polyposis on her father side.  Also, her mother with a history of colonic tumor.  She is status post total colectomy with J-pouch in 1989 at [facility].     She underwent EGD and ileoscopy 3/15/2022 which revealed Spigelman class II duodenal polyp with adenomatous changes, ileal polyps which were adenomatous.  Need to repeat ileoscopy in 1 year secondary to family history of familial polyposis EGD years.  She is scheduled continue twice daily PPI for total of 3 months due to visualized esophagitis and taper down to once daily if possible.    She reports increased bloating beginning Saturday and Sunday but notes this is occurred intermittently over the last several years..  She did attend 2 separate celebrations over the holiday weekend and ate foods that she does not typically eat.  Heartburn currently well controlled with twice daily PPI.  She denies any particular food associations.  She has multitude of questions in regards to particular diet regimens.  She has been to see 3 separate nutritionist in the past all of which she reports were unable to help her.    She does note she has gained significant amount of weight over the last year and a half secondary to foot surgery subsequently followed by back surgery.  She understands that this also increases potential for heartburn/reflux.    Past Medical History:   Diagnosis Date   • Achilles tendonitis     RIGHT    • Anemia    • Arthritis     osteoarthritis, RA   • Bronchitis    • Cancer (HCC)     family history of colon cancer   • Colon polyp May 1989   • DVT (deep venous thrombosis) ([facility])     bilateral ankle, hip, lung   • Edema     BLE AT TIMES TAKES LASIX   • Familial adenomatous polyposis    • Familial polyposis    • History of  cellulitis 2019    LEFT LEG    • History of kidney stones    • History of migraine    • Pneumonia    • Spondylisthesis        Past Surgical History:   Procedure Laterality Date   • ACHILLES TENDON SURGERY Right 2020    Procedure: RIGHT CALCANEAL EXOSTECTOMY AND RETROCALCANEAL BURSECTOMY, SECONDARY ACHILLES TENDON REPAIR RECONSTRUCTION ACHILLES TENDON REPAIR;  Surgeon: Chetan Rodriguez MD;  [place]  [facility] [place] [facility] [place]  Service: Orthopedics;  Laterality: Right;   • BILE DUCT STENT PLACEMENT     • COLONOSCOPY     • CREATION / REVISION OF ILEOSTOMY / JEJUNOSTOMY     • ENDOSCOPY N/A 3/15/2022    Procedure: ESOPHAGOGASTRODUODENOSCOPY with saline lift and duodenum polypectomy and cold bx;  Surgeon: Marylou Pillai MD;  Location: [facility] ENDOSCOPY;  Service: Gastroenterology;  Laterality: N/A;  PRE - heartburn, difficulty swallowing  POST - duodenum polyp, gastric polyps, esophagitis   • FLEXIBLE SIGMOIDOSCOPY  ???   • KIDNEY STONE SURGERY      X2   • RESECTION SMALL BOWEL / CLOSURE ILEOSTOMY     • SIGMOIDOSCOPY N/A 3/15/2022    Procedure: SIGMOIDOSCOPY FLEXIBLE to anastamosis with cold polypectomies;  Surgeon: Marylou Pillai MD;  Location: [facility] ENDOSCOPY;  Service: Gastroenterology;  Laterality: N/A;  PRE - familial polyposis  POST - polyps, hemorrhoids   • SPINE SURGERY      lumbar laminectomy decompression   • TUBAL ABDOMINAL LIGATION     • UPPER GASTROINTESTINAL ENDOSCOPY  -current       Family History   Problem Relation Age of Onset   • Colon cancer Mother            • Colon cancer Father            • Malig Hyperthermia Neg Hx        Social History     Socioeconomic History   • Marital status:    Tobacco Use   • Smoking status: Former Smoker     Packs/day: 0.00     Years: 15.00     Pack years: 0.00     Types: Cigarettes     Start date: 2007     Quit date: 2020     Years since quittin.7   • Smokeless tobacco: Never Used   • Tobacco comment: Started in late  quit off  "& on for 10yrs   Vaping Use   • Vaping Use: Never used   Substance and Sexual Activity   • Alcohol use: Not Currently     Comment: Once a yr   • Drug use: Never   • Sexual activity: Not Currently     Partners: Male     Comment: Menapause       Allergies   Allergen Reactions   • Iodine Rash   • Latex Other (See Comments)     SKIN BLISTERS   • Penicillins Hives       Current Outpatient Medications on File Prior to Visit   Medication Sig Dispense Refill   • Acetaminophen (TYLENOL EXTRA STRENGTH PO) Take 1,000 mg by mouth As Needed.     • Apixaban (Eliquis DVT/PE Starter Pack) tablet Take two 5 mg tablets by mouth every 12 hours for 7 days. Followed by one 5 mg tablet every 12 hours. (Dispense starter pack if available) 74 tablet 0   • cholecalciferol (VITAMIN D3) 25 MCG (1000 UT) tablet Take 2,000 Units by mouth Daily.     • methocarbamol (ROBAXIN) 750 MG tablet Take 750 mg by mouth 2 (Two) Times a Day As Needed.     • pantoprazole (PROTONIX) 20 MG EC tablet Take 1 tablet by mouth 2 (Two) Times a Day. 180 tablet 1   • sulfaSALAzine (AZULFIDINE) 500 MG tablet      • [DISCONTINUED] pantoprazole (PROTONIX) 20 MG EC tablet Take 1 tablet by mouth 2 (Two) Times a Day. 180 tablet 1     No current facility-administered medications on file prior to visit.       Review of Systems   Constitutional: Negative for chills and fever.   HENT: Negative for trouble swallowing.    Respiratory: Negative for cough, choking and shortness of breath.    Cardiovascular: Negative for chest pain and palpitations.   Gastrointestinal: Positive for GERD (Currently well controlled with twice daily PPI). Negative for blood in stool and constipation.        Positive for abdominal bloating   Musculoskeletal: Positive for arthralgias and back pain.        Objective   Vital Signs:   /68   Pulse 76   Temp 96.3 °F (35.7 °C)   Ht 172.7 cm (68\")   Wt 116 kg (256 lb 6.4 oz)   SpO2 95%   BMI 38.99 kg/m²       Physical Exam  Vitals and nursing note " reviewed.   Constitutional:       General: She is not in acute distress.     Appearance: Normal appearance. She is obese. She is not ill-appearing.   HENT:      Head: Normocephalic and atraumatic.      Right Ear: External ear normal.      Left Ear: External ear normal.   Eyes:      General: No scleral icterus.     Conjunctiva/sclera: Conjunctivae normal.      Pupils: Pupils are equal, round, and reactive to light.   Cardiovascular:      Rate and Rhythm: Normal rate and regular rhythm.      Heart sounds: Normal heart sounds.   Pulmonary:      Effort: Pulmonary effort is normal.      Breath sounds: Normal breath sounds.   Abdominal:      General: Abdomen is flat. Bowel sounds are normal. There is no distension.      Palpations: Abdomen is soft.      Tenderness: There is no abdominal tenderness. There is no guarding or rebound.   Musculoskeletal:      Cervical back: Normal range of motion and neck supple.   Skin:     General: Skin is warm and dry.   Neurological:      Mental Status: She is alert and oriented to person, place, and time.   Psychiatric:         Mood and Affect: Mood normal.         Behavior: Behavior normal.          Result Review :              Common labs    Common Labsle 5/5/21 5/6/21   WBC 8.36 7.16   Hemoglobin 11.0 (A) 9.7 (A)   Hematocrit 35.3 (A) 31.2 (A)   Platelets 373 338   (A) Abnormal value                   Assessment and Plan      I spent 40 minutes caring for Dorita Narayan on this date of service. This time includes time spent by me in the following activities: preparing for the visit, reviewing tests, obtaining and/or reviewing a separately obtained history, performing a medically appropriate examination and/or evaluation , counseling and educating the patient/family/caregiver, ordering medications, tests, or procedures, documenting information in the medical record, independently interpreting results and communicating that information with the patient/family/caregiver and care  coordination.    Diagnoses and all orders for this visit:    1. FAP (familial adenomatous polyposis) (Primary)    2. Gastroesophageal reflux disease with esophagitis without hemorrhage    3. Adenomatous duodenal polyp    4. Hx of adenomatous colonic polyps      · Continue 20 mg pantoprazole p.o. twice daily for total of 3 months.  She will need to decrease to once daily.  · I have encouraged her to keep a food diary detailing associated symptoms.  · She is concerned as Dr. Dudley has completed a video capsule endoscopy in the past and stressed the importance of this.  Based on current recommendations, capsule study is not warranted.  · Due to the adenomatous ileal polyps, she will need to undergo an ileoscopy in 1 year.  · Due to adenomatous duodenal polyp, she will need to undergo repeat EGD in 2 years.  Antireflux measures and dietary modifications reviewed. Low acid diet reviewed. Keep head of bed elevated. Stop eating/drinking at least 3 hours prior to bedtime. Eliminate caffeine and carbonated beverages. Weight loss encouraged if BMI over 25.  · Return to clinic in 3 months, sooner if worsening of symptoms or new concerns arise.     I spent 40 minutes caring for Dorita Narayan on this date of service. This time includes time spent by me in the following activities: preparing for the visit, reviewing tests, obtaining and/or reviewing a separately obtained history, performing a medically appropriate examination and/or evaluation , counseling and educating the patient/family/caregiver, ordering medications, tests, or procedures, documenting information in the medical record, independently interpreting results and communicating that information with the patient/family/caregiver and care coordination.      Follow Up   Return in about 3 months (around 7/19/2022).    Dragon dictation used throughout this note.     ESTIVEN Bender

## 2022-06-15 ENCOUNTER — HOSPITAL ENCOUNTER (EMERGENCY)
Dept: HOSPITAL 49 - FER | Age: 57
Discharge: OTHER INSTITUTION | End: 2022-06-15
Payer: COMMERCIAL

## 2022-06-15 DIAGNOSIS — Z79.01: ICD-10-CM

## 2022-06-15 DIAGNOSIS — Z88.0: ICD-10-CM

## 2022-06-15 DIAGNOSIS — Z86.718: ICD-10-CM

## 2022-06-15 DIAGNOSIS — Z91.041: ICD-10-CM

## 2022-06-15 DIAGNOSIS — Z91.040: ICD-10-CM

## 2022-06-15 DIAGNOSIS — D73.5: Primary | ICD-10-CM

## 2022-06-15 DIAGNOSIS — D68.59: ICD-10-CM

## 2022-06-15 DIAGNOSIS — Z87.891: ICD-10-CM

## 2022-06-15 LAB
ALBUMIN SERPL-MCNC: 3.8 G/DL (ref 3.4–5)
ALKALINE PHOSHATASE: 82 U/L (ref 46–116)
ALT SERPL-CCNC: 29 U/L (ref 14–59)
AST: 19 U/L (ref 15–37)
BACTERIA: (no result)
BASOPHIL: 0.4 % (ref 0–2)
BILIRUBIN - TOTAL: 0.3 MG/DL (ref 0.2–1)
BILIRUBIN: NEGATIVE MG/DL
BLOOD: (no result) ERY/UL
BUN SERPL-MCNC: 16 MG/DL (ref 7–18)
BUN/CREAT RATIO (CALC): 22.9 RATIO
CHLORIDE: 103 MMOL/L (ref 98–107)
CLARITY UR: CLEAR
CO2 (BICARBONATE): 26 MMOL/L (ref 21–32)
COLOR: YELLOW
CREATININE: 0.7 MG/DL (ref 0.51–0.95)
EOSINOPHIL: 0.8 % (ref 0–5)
GLOBULIN (CALCULATION): 3.7 G/DL
GLUCOSE (U): NORMAL MG/DL
GLUCOSE SERPL-MCNC: 112 MG/DL (ref 74–106)
HCT: 38.9 % (ref 37–47)
HCT: 40.4 % (ref 37–47)
HGB BLD-MCNC: 12.1 G/DL (ref 12.5–16)
HGB BLD-MCNC: 12.7 G/DL (ref 12.5–16)
INR PPP: 1.11 (ref 0.9–1.2)
LEUKOCYTES: NEGATIVE LEU/UL
LIPASE: 499 U/L (ref 73–393)
LYMPHOCYTE: 18.5 % (ref 15–48)
MAGNESIUM SERPL-MCNC: 2 MG/DL (ref 1.8–2.4)
MCH RBC QN AUTO: 28.9 PG (ref 25–31)
MCHC RBC AUTO-ENTMCNC: 31.4 G/DL (ref 32–36)
MCV: 91.8 FL (ref 78–100)
MONOCYTE: 13.3 % (ref 0–12)
MPV: 8.9 FL (ref 6–9.5)
NEUTROPHIL: 66.6 % (ref 41–80)
NITRITE: NEGATIVE MG/DL
NRBC: 0
PLT: 373 K/UL (ref 150–400)
POTASSIUM: 4.2 MMOL/L (ref 3.5–5.1)
PROTEIN: NEGATIVE MG/DL
PROTHROMBIN TIME: 13.7 SECONDS (ref 11.8–13.4)
PTT: 30.3 SECONDS (ref 24.4–34.7)
RBC MORPHOLOGY: NORMAL
RBC: 4.4 M/UL (ref 4.2–5.4)
RDW: 15.9 % (ref 11.5–14)
SPECIFIC GRAVITY: 1.02 (ref 1–1.03)
SQUAMOUS EPITHELIAL CELLS: (no result)
TOTAL PROTEIN: 7.5 G/DL (ref 6.4–8.2)
URINARY RBC: (no result)
URINARY WBC: (no result)
UROBILINOGEN: 0.2 MG/DL (ref 0.2–1)
WBC: 13.8 K/UL (ref 4–10.5)

## 2022-06-17 LAB
DRVVT CONFIRM: 1.2 RATIO (ref 0.8–1.2)
DRVVT: 59.3 SEC (ref 0–47)
PTT-LA: 35.2 SEC (ref 0–51.9)

## 2022-07-18 ENCOUNTER — OFFICE VISIT (OUTPATIENT)
Dept: GASTROENTEROLOGY | Facility: CLINIC | Age: 57
End: 2022-07-18

## 2022-07-18 VITALS
HEIGHT: 68 IN | WEIGHT: 247 LBS | SYSTOLIC BLOOD PRESSURE: 130 MMHG | BODY MASS INDEX: 37.44 KG/M2 | TEMPERATURE: 98.3 F | DIASTOLIC BLOOD PRESSURE: 80 MMHG

## 2022-07-18 DIAGNOSIS — D13.2 DUODENAL ADENOMA: ICD-10-CM

## 2022-07-18 DIAGNOSIS — R14.0 ABDOMINAL BLOATING: Primary | ICD-10-CM

## 2022-07-18 DIAGNOSIS — K21.9 GASTROESOPHAGEAL REFLUX DISEASE, UNSPECIFIED WHETHER ESOPHAGITIS PRESENT: ICD-10-CM

## 2022-07-18 DIAGNOSIS — D12.6 FAP (FAMILIAL ADENOMATOUS POLYPOSIS): ICD-10-CM

## 2022-07-18 PROCEDURE — 99215 OFFICE O/P EST HI 40 MIN: CPT | Performed by: PHYSICIAN ASSISTANT

## 2022-07-19 ENCOUNTER — TELEPHONE (OUTPATIENT)
Dept: GASTROENTEROLOGY | Facility: CLINIC | Age: 57
End: 2022-07-19

## 2022-07-19 NOTE — TELEPHONE ENCOUNTER
----- Message from ESTIVEN Bender sent at 7/18/2022 10:58 AM EDT -----  Please arrange SIBO test kit to be

## 2022-07-19 NOTE — TELEPHONE ENCOUNTER
----- Message from ESTIVEN Bender sent at 7/18/2022 11:04 AM EDT -----  Please obtain records from [facility] in [place]

## 2022-07-19 NOTE — TELEPHONE ENCOUNTER
CDI requisition completed.     Call to [facility] Records @ [phone] and spoke with Agustina.  Per instructions, SABRINA faxed to  - confirmation received.

## 2022-07-25 NOTE — TELEPHONE ENCOUNTER
Call to [facility] and spoke with Agustina. States only record available is outpt radiology in July.  Will fax to .

## 2022-07-28 NOTE — TELEPHONE ENCOUNTER
CTA of the abdomen and pelvis reviewed 7/12/2022 with splenic artery embolization with coils, large splenic infarct with large subcapsular fluid collection likely representing old hematoma measuring 13 x 9 cm which communicates with 2 small fluid-filled collections in the gastrohepatic ligament, diffuse fatty liver, status post colectomy without obstruction

## 2022-11-09 ENCOUNTER — OFFICE VISIT (OUTPATIENT)
Dept: GASTROENTEROLOGY | Facility: CLINIC | Age: 57
End: 2022-11-09

## 2022-11-09 VITALS
SYSTOLIC BLOOD PRESSURE: 102 MMHG | HEIGHT: 68 IN | BODY MASS INDEX: 35.19 KG/M2 | OXYGEN SATURATION: 96 % | TEMPERATURE: 96.1 F | HEART RATE: 80 BPM | DIASTOLIC BLOOD PRESSURE: 71 MMHG | WEIGHT: 232.2 LBS

## 2022-11-09 DIAGNOSIS — D12.6 FAP (FAMILIAL ADENOMATOUS POLYPOSIS): ICD-10-CM

## 2022-11-09 DIAGNOSIS — R14.0 ABDOMINAL BLOATING: Primary | ICD-10-CM

## 2022-11-09 DIAGNOSIS — K21.9 GASTROESOPHAGEAL REFLUX DISEASE, UNSPECIFIED WHETHER ESOPHAGITIS PRESENT: ICD-10-CM

## 2022-11-09 DIAGNOSIS — D13.2 DUODENAL ADENOMA: ICD-10-CM

## 2022-11-09 PROCEDURE — 99214 OFFICE O/P EST MOD 30 MIN: CPT | Performed by: INTERNAL MEDICINE

## 2022-11-09 RX ORDER — PANTOPRAZOLE SODIUM 20 MG/1
20 TABLET, DELAYED RELEASE ORAL DAILY
Qty: 30 TABLET | Refills: 3 | Status: SHIPPED | OUTPATIENT
Start: 2022-11-09 | End: 2023-03-28

## 2022-11-09 NOTE — PROGRESS NOTES
Subjective   Chief Complaint   Patient presents with   • Bloated     resolved       Dorita Narayan is a  56 y.o. female here for a follow up visit for bloating.     History of familial polyposis, total colectomy with J-pouch in 1989 at [facility].  She also has a history of rheumatoid arthritis.    In June 2022 she proceeded to [facility] after severe back pain. 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 Unfortunately there were no beds available in [place] and she was sent to [facility] in [place]. She states she underwent coil embolization of the splenic artery.      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 Margo Starks in the remote past.      She underwent EGD and ileoscopy 3/15/2022 which revealed Spigelman class II duodenal polyp with adenomatous changes, ileal polyps which were adenomatous. Repeat ileoscopy in 1 year 2023.   Repeat egd in 2 years 2024.    She is feeling much better.  Bloating is resolved.  She has lost 27 lbs with calorie restriction.  She is using optivia.  She has some initial bowel changes but this improved with time.  She drinks nothing but water.    Heartburn well controlled with pantoprazole.  HPI  Past Medical History:   Diagnosis Date   • Achilles tendonitis     RIGHT    • Anemia    • Arthritis     osteoarthritis, RA   • Bronchitis    • Cancer (HCC)     family history of colon cancer   • Colon polyp May 1989   • DVT (deep venous thrombosis) (HCC)     bilateral ankle, hip, lung   • Edema     BLE AT TIMES TAKES LASIX   • Familial adenomatous polyposis    • Familial polyposis    • History of cellulitis 2019     LEFT LEG    • History of kidney stones    • History of migraine    • Pneumonia    • Spondylisthesis      Past Surgical History:   Procedure Laterality Date   • ACHILLES TENDON SURGERY Right 07/20/2020    Procedure: RIGHT CALCANEAL EXOSTECTOMY AND RETROCALCANEAL BURSECTOMY, SECONDARY ACHILLES TENDON REPAIR RECONSTRUCTION ACHILLES TENDON REPAIR;  Surgeon: Chetan Rodriguez MD;  Location:  [facility];  Service: Orthopedics;  Laterality: Right;   • BILE DUCT STENT PLACEMENT     • COLONOSCOPY     • CREATION / REVISION OF ILEOSTOMY / JEJUNOSTOMY     • ENDOSCOPY N/A 03/15/2022    Procedure: ESOPHAGOGASTRODUODENOSCOPY with saline lift and duodenum polypectomy and cold bx;  Surgeon: Marylou Pillai MD;  [facility];  Service: Gastroenterology;  Laterality: N/A;  PRE - heartburn, difficulty swallowing  POST - duodenum polyp, gastric polyps, esophagitis   • FLEXIBLE SIGMOIDOSCOPY  ???   • KIDNEY STONE SURGERY      X2   • RESECTION SMALL BOWEL / CLOSURE ILEOSTOMY     • SIGMOIDOSCOPY N/A 03/15/2022    Procedure: SIGMOIDOSCOPY FLEXIBLE to anastamosis with cold polypectomies;  Surgeon: Marylou Pillai MD;  [facility];  Service: Gastroenterology;  Laterality: N/A;  PRE - familial polyposis  POST - polyps, hemorrhoids   • SPINE SURGERY      lumbar laminectomy decompression   • SPLENIC ARTERY EMBOLIZATION  06/16/2022   • TUBAL ABDOMINAL LIGATION     • UPPER GASTROINTESTINAL ENDOSCOPY  1989-current       Current Outpatient Medications:   •  Acetaminophen (TYLENOL EXTRA STRENGTH PO), Take 1,000 mg by mouth As Needed., Disp: , Rfl:   •  cholecalciferol (VITAMIN D3) 25 MCG (1000 UT) tablet, Take 2,000 Units by mouth Daily., Disp: , Rfl:   •  methocarbamol (ROBAXIN) 750 MG tablet, Take 750 mg by mouth 2 (Two) Times a Day As Needed., Disp: , Rfl:   •  pantoprazole (PROTONIX) 20 MG EC tablet, Take 1 tablet by mouth Daily., Disp: 30 tablet, Rfl: 3  •  sulfaSALAzine (AZULFIDINE) 500 MG tablet, Take 1  tablet by mouth 2 (Two) Times a Day., Disp: , Rfl:   PRN Meds:.  Allergies   Allergen Reactions   • Iodine Rash   • Latex Other (See Comments)     SKIN BLISTERS   • Penicillins Hives     Social History     Socioeconomic History   • Marital status:    Tobacco Use   • Smoking status: Former     Packs/day: 0.00     Years: 15.00     Pack years: 0.00     Types: Cigarettes     Start date: 2007     Quit date: 2020     Years since quittin.3   • Smokeless tobacco: Never   • Tobacco comments:     Started in late 20's quit off & on for 10yrs   Vaping Use   • Vaping Use: Never used   Substance and Sexual Activity   • Alcohol use: Not Currently     Comment: Once a yr   • Drug use: Never   • Sexual activity: Not Currently     Partners: Male     Comment: Menapause     Family History   Problem Relation Age of Onset   • Colon cancer Mother            • Colon cancer Father            • Malig Hyperthermia Neg Hx      Review of Systems   Constitutional: Negative for appetite change and unexpected weight change.   Gastrointestinal: Negative for abdominal distention, abdominal pain, constipation and diarrhea.     Vitals:    22 1347   BP: 102/71   Pulse: 80   Temp: 96.1 °F (35.6 °C)   SpO2: 96%         22  1347   Weight: 105 kg (232 lb 3.2 oz)       Objective   Physical Exam  Constitutional:       Appearance: Normal appearance. She is well-developed.   HENT:      Head: Normocephalic and atraumatic.   Eyes:      General: No scleral icterus.     Conjunctiva/sclera: Conjunctivae normal.   Pulmonary:      Effort: Pulmonary effort is normal.   Abdominal:      General: There is no distension.      Palpations: Abdomen is soft.   Musculoskeletal:      Cervical back: Normal range of motion and neck supple.   Skin:     General: Skin is warm and dry.   Neurological:      Mental Status: She is alert.   Psychiatric:         Mood and Affect: Mood normal.         Behavior: Behavior normal.       No  radiology results for the last 7 days    Assessment & Plan   Diagnoses and all orders for this visit:    Abdominal bloating  Comments:  Resolved with weight loss and resolution of splenic hematoma    FAP (familial adenomatous polyposis)    Gastroesophageal reflux disease, unspecified whether esophagitis present    Duodenal adenoma    Other orders  -     pantoprazole (PROTONIX) 20 MG EC tablet; Take 1 tablet by mouth Daily.      Plan:  · GERD stable on pantoprazole once daily-medication refilled  · She has lost significant weight on a calorie restricted diet.  I urged her to continue her efforts.  Her bloating has resolved.  Bowel movements are stable.  · She is due for repeat ileoscopy in March 2023, repeat EGD in March 2024.

## 2022-12-29 ENCOUNTER — DOCUMENTATION (OUTPATIENT)
Dept: GASTROENTEROLOGY | Facility: CLINIC | Age: 57
End: 2022-12-29
Payer: COMMERCIAL

## 2023-02-22 ENCOUNTER — OFFICE VISIT (OUTPATIENT)
Dept: GASTROENTEROLOGY | Facility: CLINIC | Age: 58
End: 2023-02-22
Payer: MEDICARE

## 2023-02-22 VITALS
HEART RATE: 69 BPM | DIASTOLIC BLOOD PRESSURE: 60 MMHG | BODY MASS INDEX: 31.67 KG/M2 | OXYGEN SATURATION: 96 % | WEIGHT: 209 LBS | HEIGHT: 68 IN | TEMPERATURE: 97.3 F | SYSTOLIC BLOOD PRESSURE: 102 MMHG

## 2023-02-22 DIAGNOSIS — R93.5 ABNORMAL CT OF THE ABDOMEN: ICD-10-CM

## 2023-02-22 DIAGNOSIS — K85.80 OTHER ACUTE PANCREATITIS, UNSPECIFIED COMPLICATION STATUS: Primary | ICD-10-CM

## 2023-02-22 PROCEDURE — 99214 OFFICE O/P EST MOD 30 MIN: CPT | Performed by: INTERNAL MEDICINE

## 2023-02-22 RX ORDER — OXYCODONE HYDROCHLORIDE 5 MG/1
5 TABLET ORAL
COMMUNITY
Start: 2023-02-17 | End: 2023-02-23

## 2023-02-22 NOTE — PROGRESS NOTES
Subjective   Chief Complaint   Patient presents with   • ED/Hospital f/up-ABD pain, pancreatitis   • ABN CT ABD & ABN MRI ABD   • EGD & C scope f/up       Dorita Narayan is a  57 y.o. female here for a follow up visit for recent hospitalization for acute abdominal pain, presumed pancreatitis.    She feels better overall - still with some bloating and epigastric pain.  Tolerating normal diet on her plan - low volume.    She has lost 50 lbs since I saw her last.   She has been on a calorie restricted diet about 1000 iggy/d.  She is following a diet plan optavia.    She had a recent hospitalization in [place].  She woke up with pain in her abdomen abruptly - she was found to have fluid collections around the spleen.  Lipase was elevated at 300.  CT scan showed some calcification in the pancreas suggestive of chronic pancreatitis.  She had egd/c/s at [facility] during this hospitalization.   MRI shows mild pancreatic ductal dilation.  She was treated as presumed pancreatitis. She has a recent hospitalization with presumed pancreatitis - imaging suggestive of p divisum upon visual review by the gastroenterologist that saw her however this was not dictated in the film is not available to me.    BM unchanged.    PMH:  History of familial polyposis, total colectomy with J-pouch in 1989 at [facility].  She also has a history of rheumatoid arthritis.    [facility]. 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 Unfortunately there were no beds available in [place] and she was sent to [facility] in [place]. She states she underwent coil embolization of the splenic artery.     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 Margo Starks in the remote past.      She underwent EGD and ileoscopy 3/15/2022 which revealed Spigelman class II duodenal polyp with adenomatous changes, ileal polyps which were adenomatous. Repeat ileoscopy in 1 year 2023.   Repeat egd in 2 years 2024.  HPI  Past Medical History:   Diagnosis Date   • Achilles tendonitis     RIGHT    • Anemia    • Arthritis     osteoarthritis, RA   • Bronchitis    • Cancer (HCC)     family history of colon cancer   • Colon polyp May 1989   • DVT (deep venous thrombosis) (HCC)     bilateral ankle, hip, lung   • Edema     BLE AT TIMES TAKES LASIX   • Familial adenomatous polyposis    • Familial polyposis    • History of cellulitis 2019    LEFT LEG    • History of kidney stones    • History of migraine    • Pneumonia    • Spondylisthesis      Past Surgical History:   Procedure Laterality Date   • ACHILLES TENDON SURGERY Right 07/20/2020    Procedure: RIGHT CALCANEAL EXOSTECTOMY AND RETROCALCANEAL BURSECTOMY, SECONDARY ACHILLES TENDON REPAIR RECONSTRUCTION ACHILLES TENDON REPAIR;  Surgeon: Chetan Rodriguez MD;  Location: [facility];  Service: Orthopedics;  Laterality: Right;   • BILE DUCT STENT PLACEMENT     • COLONOSCOPY  02/14/2023    Julius HWANG   • CREATION / REVISION OF ILEOSTOMY / JEJUNOSTOMY     • ENDOSCOPY N/A 03/15/2022    Procedure: ESOPHAGOGASTRODUODENOSCOPY with saline lift and duodenum polypectomy and cold bx;  Surgeon: Marylou Pillai MD;  Location: [facility] ENDOSCOPY;  Service: Gastroenterology;  Laterality: N/A;  PRE - heartburn, difficulty swallowing  POST - duodenum polyp, gastric polyps, esophagitis   • ENDOSCOPY  02/14/2023    Julius HWANG   • FLEXIBLE SIGMOIDOSCOPY  ???   • KIDNEY STONE SURGERY      X2   • RESECTION SMALL BOWEL / CLOSURE ILEOSTOMY     • SIGMOIDOSCOPY N/A 03/15/2022    Procedure: SIGMOIDOSCOPY FLEXIBLE to anastamosis  with cold polypectomies;  Surgeon: Marylou Pillai MD;  Location: [facility] ENDOSCOPY;  Service: Gastroenterology;  Laterality: N/A;  PRE - familial polyposis  POST - polyps, hemorrhoids   • SPINE SURGERY      lumbar laminectomy decompression   • SPLENIC ARTERY EMBOLIZATION  2022   • TUBAL ABDOMINAL LIGATION     • UPPER GASTROINTESTINAL ENDOSCOPY  -current       Current Outpatient Medications:   •  Acetaminophen (TYLENOL EXTRA STRENGTH PO), Take 1,000 mg by mouth As Needed., Disp: , Rfl:   •  cholecalciferol (VITAMIN D3) 25 MCG (1000 UT) tablet, Take 2,000 Units by mouth Daily., Disp: , Rfl:   •  methocarbamol (ROBAXIN) 750 MG tablet, Take 750 mg by mouth 2 (Two) Times a Day As Needed., Disp: , Rfl:   •  pantoprazole (PROTONIX) 20 MG EC tablet, Take 1 tablet by mouth Daily., Disp: 30 tablet, Rfl: 3  •  sulfaSALAzine (AZULFIDINE) 500 MG tablet, Take 1 tablet by mouth 2 (Two) Times a Day., Disp: , Rfl:   •  oxyCODONE (ROXICODONE) 5 MG immediate release tablet, Take 5 mg by mouth., Disp: , Rfl:   PRN Meds:.  Allergies   Allergen Reactions   • Iodine Rash   • Latex Other (See Comments)     SKIN BLISTERS   • Penicillins Hives     Social History     Socioeconomic History   • Marital status:    Tobacco Use   • Smoking status: Former     Packs/day: 0.00     Years: 15.00     Pack years: 0.00     Types: Cigarettes     Start date: 2007     Quit date: 2020     Years since quittin.6   • Smokeless tobacco: Never   • Tobacco comments:     Started in late 20's quit off & on for 10yrs   Vaping Use   • Vaping Use: Never used   Substance and Sexual Activity   • Alcohol use: Not Currently     Comment: Once a yr   • Drug use: Never   • Sexual activity: Not Currently     Partners: Male     Comment: Menapause     Family History   Problem Relation Age of Onset   • Colon cancer Mother            • Colon cancer Father            • Malig Hyperthermia Neg Hx      Review of Systems   Constitutional:  Negative for appetite change and unexpected weight change.   Gastrointestinal: Positive for abdominal pain. Negative for constipation and diarrhea.     Vitals:    02/22/23 1338   BP: 102/60   Pulse: 69   Temp: 97.3 °F (36.3 °C)   SpO2: 96%         02/22/23  1338   Weight: 94.8 kg (209 lb)       Objective   Physical Exam  Constitutional:       Appearance: Normal appearance. She is well-developed.   HENT:      Head: Normocephalic and atraumatic.   Eyes:      General: No scleral icterus.     Conjunctiva/sclera: Conjunctivae normal.   Pulmonary:      Effort: Pulmonary effort is normal.   Abdominal:      General: There is no distension.      Palpations: Abdomen is soft.   Musculoskeletal:      Cervical back: Normal range of motion and neck supple.   Skin:     General: Skin is warm and dry.   Neurological:      Mental Status: She is alert.   Psychiatric:         Mood and Affect: Mood normal.         Behavior: Behavior normal.       No radiology results for the last 7 days    Assessment & Plan   Diagnoses and all orders for this visit:    Other acute pancreatitis, unspecified complication status  -     MRI Abdomen With & Without Contrast; Future    Abnormal CT of the abdomen    Other orders  -     oxyCODONE (ROXICODONE) 5 MG immediate release tablet; Take 5 mg by mouth.      Plan:  · Continue small meals, low-fat diet.  She only rarely drinks alcohol and I have advised her not to consume any in the short-term.  · Recommend repeat imaging of her pancreas and to follow-up on fluid collections in 3 months.  Have asked her to get the disc from her recent MRI in [place] for comparison.  No mention of pancreatic divisum but this was dictated by the gastroenterologist that reviewed her imaging.  Would hold on any additional intervention at this point including ERCP until imaging reviewed and repeated

## 2023-03-28 RX ORDER — PANTOPRAZOLE SODIUM 20 MG/1
TABLET, DELAYED RELEASE ORAL
Qty: 30 TABLET | Refills: 5 | Status: SHIPPED | OUTPATIENT
Start: 2023-03-28

## 2023-06-13 ENCOUNTER — HOSPITAL ENCOUNTER (OUTPATIENT)
Dept: MRI IMAGING | Facility: HOSPITAL | Age: 58
Discharge: HOME | End: 2023-06-13
Admitting: INTERNAL MEDICINE
Payer: MEDICARE

## 2023-06-13 DIAGNOSIS — K85.80 OTHER ACUTE PANCREATITIS, UNSPECIFIED COMPLICATION STATUS: ICD-10-CM

## 2023-06-13 PROCEDURE — A9577 INJ MULTIHANCE: HCPCS | Performed by: INTERNAL MEDICINE

## 2023-06-13 PROCEDURE — 74183 MRI ABD W/O CNTR FLWD CNTR: CPT

## 2023-06-13 PROCEDURE — 0 GADOBENATE DIMEGLUMINE 529 MG/ML SOLUTION: Performed by: INTERNAL MEDICINE

## 2023-06-13 RX ADMIN — GADOBENATE DIMEGLUMINE 20 ML: 529 INJECTION, SOLUTION INTRAVENOUS at 11:52

## 2023-06-30 ENCOUNTER — TELEPHONE (OUTPATIENT)
Dept: GASTROENTEROLOGY | Facility: CLINIC | Age: 58
End: 2023-06-30

## 2023-06-30 NOTE — TELEPHONE ENCOUNTER
Hub staff attempted to follow warm transfer process and was unsuccessful     Caller: Dorita Narayan    Relationship to patient: Self    Best call back number: [phone]    Patient is needing: PATIENT CALLING BECAUSE SHE HAS CONTACTED DR. [facility] [employer] [facility] THIS WEEK ABOUT HER REFERRAL DR. BLANCAS WAS SUPPOSED TO SEND TO DR. CASTELLON'S OFFICE. HE ALSO NEEDS HER MEDICAL RECORDS TOWARDS THIS REFERRAL. PATIENT WAS ENCOURAGED NOT TO LET THIS REFERRAL GO PAST 1 WEEK BUT REFERRAL WAS NEVER RECEIVED BY DR. [facility] OFFICE. PLEASE REVIEW AND CONTACT PATIENT FOR FURTHER INFORMATION IF NEEDED.

## 2023-08-28 RX ORDER — PANCRELIPASE 24000; 76000; 120000 [USP'U]/1; [USP'U]/1; [USP'U]/1
CAPSULE, DELAYED RELEASE PELLETS ORAL
Qty: 180 CAPSULE | Refills: 6 | Status: SHIPPED | OUTPATIENT
Start: 2023-08-28

## 2023-09-15 ENCOUNTER — TELEPHONE (OUTPATIENT)
Dept: GASTROENTEROLOGY | Facility: CLINIC | Age: 58
End: 2023-09-15
Payer: MEDICARE

## 2023-09-15 NOTE — TELEPHONE ENCOUNTER
Hub staff attempted to follow warm transfer process and was unsuccessful     Caller: Dorita Narayan    Relationship to patient: Self    Best call back number: [phone]     Patient is needing: PATIENT IS RETURNING CALL FROM [facility] TO CONFIRM SCOPE ON 9/19/23 WITH DR. BLANCAS.  PATIENT ALSO HAS QUESTIONS REGARDING HER PREP AND THE MEDICATIONS SHE IS TAKING.  PLEASE CALL BACK TO ADVISE.

## 2023-09-19 ENCOUNTER — ANESTHESIA (OUTPATIENT)
Dept: GASTROENTEROLOGY | Facility: HOSPITAL | Age: 58
End: 2023-09-19
Payer: MEDICARE

## 2023-09-19 ENCOUNTER — ANESTHESIA EVENT (OUTPATIENT)
Dept: GASTROENTEROLOGY | Facility: HOSPITAL | Age: 58
End: 2023-09-19
Payer: MEDICARE

## 2023-09-19 ENCOUNTER — HOSPITAL ENCOUNTER (OUTPATIENT)
Facility: HOSPITAL | Age: 58
Setting detail: HOSPITAL OUTPATIENT SURGERY
Discharge: HOME | End: 2023-09-19
Attending: INTERNAL MEDICINE | Admitting: INTERNAL MEDICINE

## 2023-09-19 VITALS
HEART RATE: 63 BPM | OXYGEN SATURATION: 93 % | HEIGHT: 68 IN | DIASTOLIC BLOOD PRESSURE: 57 MMHG | RESPIRATION RATE: 16 BRPM | BODY MASS INDEX: 30.62 KG/M2 | SYSTOLIC BLOOD PRESSURE: 114 MMHG | WEIGHT: 202 LBS

## 2023-09-19 DIAGNOSIS — D12.6 FAP (FAMILIAL ADENOMATOUS POLYPOSIS): ICD-10-CM

## 2023-09-19 DIAGNOSIS — D13.91 FAP (FAMILIAL ADENOMATOUS POLYPOSIS): ICD-10-CM

## 2023-09-19 PROCEDURE — 44382 SMALL BOWEL ENDOSCOPY: CPT | Performed by: INTERNAL MEDICINE

## 2023-09-19 PROCEDURE — 25010000002 PROPOFOL 10 MG/ML EMULSION: Performed by: NURSE ANESTHETIST, CERTIFIED REGISTERED

## 2023-09-19 PROCEDURE — 88305 TISSUE EXAM BY PATHOLOGIST: CPT | Performed by: INTERNAL MEDICINE

## 2023-09-19 PROCEDURE — S0260 H&P FOR SURGERY: HCPCS | Performed by: INTERNAL MEDICINE

## 2023-09-19 RX ORDER — PANTOPRAZOLE SODIUM 20 MG/1
20 TABLET, DELAYED RELEASE ORAL DAILY
Qty: 90 TABLET | Refills: 1 | Status: SHIPPED | OUTPATIENT
Start: 2023-09-19

## 2023-09-19 RX ORDER — LIDOCAINE HYDROCHLORIDE 20 MG/ML
INJECTION, SOLUTION INFILTRATION; PERINEURAL AS NEEDED
Status: DISCONTINUED | OUTPATIENT
Start: 2023-09-19 | End: 2023-09-19 | Stop reason: SURG

## 2023-09-19 RX ORDER — SODIUM CHLORIDE, SODIUM LACTATE, POTASSIUM CHLORIDE, CALCIUM CHLORIDE 600; 310; 30; 20 MG/100ML; MG/100ML; MG/100ML; MG/100ML
30 INJECTION, SOLUTION INTRAVENOUS CONTINUOUS PRN
Status: DISCONTINUED | OUTPATIENT
Start: 2023-09-19 | End: 2023-09-19 | Stop reason: HOSPADM

## 2023-09-19 RX ORDER — PROPOFOL 10 MG/ML
VIAL (ML) INTRAVENOUS CONTINUOUS PRN
Status: DISCONTINUED | OUTPATIENT
Start: 2023-09-19 | End: 2023-09-19 | Stop reason: SURG

## 2023-09-19 RX ADMIN — SODIUM CHLORIDE, POTASSIUM CHLORIDE, SODIUM LACTATE AND CALCIUM CHLORIDE 30 ML/HR: 600; 310; 30; 20 INJECTION, SOLUTION INTRAVENOUS at 09:30

## 2023-09-19 RX ADMIN — PROPOFOL 200 MCG/KG/MIN: 10 INJECTION, EMULSION INTRAVENOUS at 09:43

## 2023-09-19 RX ADMIN — LIDOCAINE HYDROCHLORIDE 60 MG: 20 INJECTION, SOLUTION INFILTRATION; PERINEURAL at 09:43

## 2023-09-19 NOTE — ANESTHESIA PREPROCEDURE EVALUATION
Anesthesia Evaluation     Patient summary reviewed                Airway   Mallampati: II  Neck ROM: full  Dental      Pulmonary    Cardiovascular     Rhythm: regular    (+) DVT      Neuro/Psych  GI/Hepatic/Renal/Endo      ROS Comment: Hx familial polyposis    Musculoskeletal     Abdominal    Substance History      OB/GYN          Other   arthritis,                   Anesthesia Plan    ASA 2     MAC       Anesthetic plan, risks, benefits, and alternatives have been provided, discussed and informed consent has been obtained with: patient.    CODE STATUS:

## 2023-09-19 NOTE — DISCHARGE INSTRUCTIONS

## 2023-09-19 NOTE — ANESTHESIA POSTPROCEDURE EVALUATION
Patient: Dorita Narayan    Procedure Summary       Date: 09/19/23 Room / Location:  [facility] /  [facility]    Anesthesia Start: 0935 Anesthesia Stop: 0954    Procedure: ILEOSCOPY WITH COLD BIOPSIES Diagnosis:       FAP (familial adenomatous polyposis)      (FAP (familial adenomatous polyposis) [D12.6])    Surgeons: Marylou Pillai MD Provider: Varghese Keller MD    Anesthesia Type: MAC ASA Status: 2            Anesthesia Type: MAC    Vitals  Vitals Value Taken Time   /57 09/19/23 1014   Temp     Pulse 63 09/19/23 1014   Resp 16 09/19/23 1014   SpO2 93 % 09/19/23 1014           Post Anesthesia Care and Evaluation    Patient location during evaluation: PACU  Patient participation: complete - patient participated  Level of consciousness: awake  Pain score: 1  Pain management: adequate    Airway patency: patent  Anesthetic complications: No anesthetic complications  PONV Status: none  Cardiovascular status: acceptable  Respiratory status: acceptable  Hydration status: acceptable

## 2023-09-19 NOTE — H&P
[facility]  Pre Procedure History & Physical    Chief Complaint:   H/o FAP    Subjective     HPI:    yo here today for ileoscopy. History of familial polyposis, total colectomy with J-pouch in 1989 at [facility].  She also has a history of rheumatoid arthritis.  H/o biliary stent placement x2 by Dr. Margo Starks in the remote past.       Past Medical History:   Past Medical History:   Diagnosis Date    Achilles tendonitis     RIGHT     Anemia     Arthritis     osteoarthritis, RA    Bronchitis     Cancer     family history of colon cancer    Colon polyp May 1989    DVT (deep venous thrombosis)     bilateral ankle, hip, lung    Edema     BLE AT TIMES TAKES LASIX    Familial adenomatous polyposis     Familial polyposis     History of cellulitis 2019    LEFT LEG     History of kidney stones     History of migraine     Pneumonia     Spondylisthesis        Past Surgical History:  Past Surgical History:   Procedure Laterality Date    ACHILLES TENDON SURGERY Right 07/20/2020    Procedure: RIGHT CALCANEAL EXOSTECTOMY AND RETROCALCANEAL BURSECTOMY, SECONDARY ACHILLES TENDON REPAIR RECONSTRUCTION ACHILLES TENDON REPAIR;  Surgeon: Chetan Rodriguez MD;  Location: [facility];  Service: Orthopedics;  Laterality: Right;    BILE DUCT STENT PLACEMENT      COLONOSCOPY  02/14/2023    Julius HWANG    CREATION / REVISION OF ILEOSTOMY / JEJUNOSTOMY      ENDOSCOPY N/A 03/15/2022    Procedure: ESOPHAGOGASTRODUODENOSCOPY with saline lift and duodenum polypectomy and cold bx;  Surgeon: Marylou Pillai MD;  Location: [facility] ENDOSCOPY;  Service: Gastroenterology;  Laterality: N/A;  PRE - heartburn, difficulty swallowing  POST - duodenum polyp, gastric polyps, esophagitis    ENDOSCOPY  02/14/2023    Julius HWANG    FLEXIBLE SIGMOIDOSCOPY  ???    KIDNEY STONE SURGERY      X2    RESECTION SMALL BOWEL / CLOSURE ILEOSTOMY      SIGMOIDOSCOPY N/A 03/15/2022    Procedure: SIGMOIDOSCOPY FLEXIBLE to anastamosis with cold  "polypectomies;  Surgeon: Marylou Pillai MD;  Location: [facility] ENDOSCOPY;  Service: Gastroenterology;  Laterality: N/A;  PRE - familial polyposis  POST - polyps, hemorrhoids    SPINE SURGERY      lumbar laminectomy decompression    SPLENIC ARTERY EMBOLIZATION  2022    TUBAL ABDOMINAL LIGATION      UPPER GASTROINTESTINAL ENDOSCOPY  -current       Family History:  Family History   Problem Relation Age of Onset    Colon cancer Mother             Colon cancer Father             Gordon Hyperthermia Neg Hx        Social History:   reports that she quit smoking about 3 years ago. Her smoking use included cigarettes. She started smoking about 16 years ago. She has never used smokeless tobacco. She reports that she does not currently use alcohol. She reports that she does not use drugs.    Medications:   Medications Prior to Admission   Medication Sig Dispense Refill Last Dose    cholecalciferol (VITAMIN D3) 25 MCG (1000 UT) tablet Take 2 tablets by mouth Daily.   2023    pancrelipase, Lip-Prot-Amyl, (Creon) 76002-96490 units capsule delayed-release particles capsule TAKE 2 CAPSULES BY MOUTH 3 TIMES DAILY WITH MEALS 180 capsule 6 2023    pantoprazole (PROTONIX) 20 MG EC tablet Take 1 tablet by mouth Daily. 90 tablet 1 2023    sulfaSALAzine (AZULFIDINE) 500 MG tablet Take 1 tablet by mouth 2 (Two) Times a Day.   2023    Acetaminophen (TYLENOL EXTRA STRENGTH PO) Take 1,000 mg by mouth As Needed.   More than a month    methocarbamol (ROBAXIN) 750 MG tablet Take 1 tablet by mouth 2 (Two) Times a Day As Needed.   More than a month       Allergies:  Iodine, Latex, and Penicillins    ROS:    Pertinent items are noted in HPI     Objective     Blood pressure 106/64, pulse 61, resp. rate 16, height 172.7 cm (68\"), weight 91.6 kg (202 lb), SpO2 94 %.    Physical Exam   Constitutional: Pt is oriented to person, place, and time and well-developed, well-nourished, and in no distress. "   Mouth/Throat: Oropharynx is clear and moist.   Neck: Normal range of motion.   Cardiovascular: Normal rate, regular rhythm    Pulmonary/Chest: Effort normal    Abdominal: Soft. Nontender  Skin: Skin is warm and dry.   Psychiatric: Mood, memory, affect and judgment normal.     Assessment & Plan     Diagnosis:  H/o FAP    Anticipated Surgical Procedure:  ileoscopy    The risks, benefits, and alternatives of this procedure have been discussed with the patient or the responsible party- the patient understands and agrees to proceed.

## 2023-09-20 LAB
LAB AP CASE REPORT: NORMAL
LAB AP DIAGNOSIS COMMENT: NORMAL
PATH REPORT.FINAL DX SPEC: NORMAL
PATH REPORT.GROSS SPEC: NORMAL

## 2023-09-21 ENCOUNTER — TELEPHONE (OUTPATIENT)
Dept: GASTROENTEROLOGY | Facility: CLINIC | Age: 58
End: 2023-09-21
Payer: MEDICARE

## 2023-09-21 NOTE — TELEPHONE ENCOUNTER
Please review the office visit/labs from [facility] scanned in the media section of the patient chart.

## 2023-09-27 NOTE — PROGRESS NOTES
Nonspecific inflammation seen on biopsy.  No polyp-like change or precancerous change.  Visually the changes were very mild and not concerning. Rec repeat ileoscopy 1 year with EGD at that time due to h/o FAP

## 2023-09-28 ENCOUNTER — TELEPHONE (OUTPATIENT)
Dept: GASTROENTEROLOGY | Facility: CLINIC | Age: 58
End: 2023-09-28
Payer: MEDICARE

## 2023-09-28 NOTE — TELEPHONE ENCOUNTER
Nonspecific inflammation seen on biopsy.  No polyp-like change or precancerous change.  Visually the changes were very mild and not concerning. Rec repeat ileoscopy 1 year with EGD at that time due to h/o FAP   Written by Marylou Pillai MD on 9/27/2023  2:46 PM EDT  Seen by patient Dorita Narayan on 9/27/2023  8:34 PM    Ileoscopy placed in recall for 09/14/2024.

## 2024-03-19 ENCOUNTER — TELEPHONE (OUTPATIENT)
Dept: GASTROENTEROLOGY | Facility: CLINIC | Age: 59
End: 2024-03-19

## 2024-03-19 RX ORDER — PANCRELIPASE 24000; 76000; 120000 [USP'U]/1; [USP'U]/1; [USP'U]/1
48000 CAPSULE, DELAYED RELEASE PELLETS ORAL
Qty: 180 CAPSULE | Refills: 5 | Status: SHIPPED | OUTPATIENT
Start: 2024-03-19

## 2024-03-19 NOTE — TELEPHONE ENCOUNTER
Caller: Dorita Narayan    Relationship: Self    Best call back number: [phone]    Requested Prescriptions: pancrelipase, Lip-Prot-Amyl, (Creon) 33041-00154 units capsule delayed-release particles capsule [57943] (Order [id number])   Requested Prescriptions      No prescriptions requested or ordered in this encounter        Pharmacy where request should be sent:  [address] [phone] [facility] [phone]      Last office visit with prescribing clinician: 6/20/2023   Last telemedicine visit with prescribing clinician: Visit date not found   Next office visit with prescribing clinician: Visit date not found         Does the patient have less than a 3 day supply:  [] Yes  [x] No    Would you like a call back once the refill request has been completed: [] Yes [x] No    If the office needs to give you a call back, can they leave a voicemail: [] Yes [x] No    Alex Kam Rep   03/19/24 12:27 EDT

## 2024-03-27 NOTE — TELEPHONE ENCOUNTER
Provider: DR EMETERIO BLANCAS     Caller: GENTRY BAZZI     Relationship to Patient: SELF    Pharmacy: [facility]    Phone Number: [phone]    Reason for Call: PT HAD A CALLBACK REQUEST ONCE THIS MEDICATION WAS ORDERED AND SHE NEVER RECEIVED ONE

## 2024-03-27 NOTE — TELEPHONE ENCOUNTER
Called pt and advised that we have refilled her creon on 03/19 to [facility] . Pt verb understanding.

## 2024-06-27 ENCOUNTER — TELEPHONE (OUTPATIENT)
Dept: GASTROENTEROLOGY | Facility: CLINIC | Age: 59
End: 2024-06-27
Payer: MEDICARE

## 2024-06-27 NOTE — TELEPHONE ENCOUNTER
EGD RECALL    ILLEOSCOPY RECALL    PERSONAL HX OF POLYPS     FAMILY HX OF POLYPS     FAMILY HX OF COLON CA            LIST OF  MEDICATIONS    VITAMIN D3  PANTOPRAZOLE  SULFASALAZINE  CREON  CAPSULES  METHOCARBAMOL      OA QUESTIONNAIRE SCANNED IN MEDIA

## 2024-07-06 ENCOUNTER — PREP FOR SURGERY (OUTPATIENT)
Dept: OTHER | Facility: HOSPITAL | Age: 59
End: 2024-07-06
Payer: MEDICARE

## 2024-07-06 DIAGNOSIS — D13.91 FAP (FAMILIAL ADENOMATOUS POLYPOSIS): Primary | ICD-10-CM

## 2024-07-15 ENCOUNTER — TELEPHONE (OUTPATIENT)
Dept: GASTROENTEROLOGY | Facility: CLINIC | Age: 59
End: 2024-07-15
Payer: MEDICARE

## 2024-07-15 NOTE — TELEPHONE ENCOUNTER
SW PT TO SCHEDULE EGD/ILEOSCOPY, SHE SAID IT SHOULD BE EVERY 2 YEARS AND HER LAST WAS IN 2023. I AM DEFERRING THE PROCEDURE AND TOLD THE PT TO DISCUSS IT WITH MAX ON 8-2-24 OV

## 2024-07-30 RX ORDER — PANTOPRAZOLE SODIUM 20 MG/1
20 TABLET, DELAYED RELEASE ORAL DAILY
Qty: 90 TABLET | Refills: 0 | Status: SHIPPED | OUTPATIENT
Start: 2024-07-30

## 2024-08-02 ENCOUNTER — OFFICE VISIT (OUTPATIENT)
Dept: GASTROENTEROLOGY | Facility: CLINIC | Age: 59
End: 2024-08-02
Payer: MEDICARE

## 2024-08-02 VITALS
WEIGHT: 241.8 LBS | DIASTOLIC BLOOD PRESSURE: 68 MMHG | BODY MASS INDEX: 36.65 KG/M2 | HEIGHT: 68 IN | HEART RATE: 79 BPM | TEMPERATURE: 97.3 F | SYSTOLIC BLOOD PRESSURE: 100 MMHG

## 2024-08-02 DIAGNOSIS — Q45.3 PANCREAS DIVISUM: ICD-10-CM

## 2024-08-02 DIAGNOSIS — D13.91 FAP (FAMILIAL ADENOMATOUS POLYPOSIS): Primary | ICD-10-CM

## 2024-08-02 DIAGNOSIS — Z79.01 ANTICOAGULATED: ICD-10-CM

## 2024-08-02 DIAGNOSIS — K86.1 CHRONIC PANCREATITIS, UNSPECIFIED PANCREATITIS TYPE: ICD-10-CM

## 2024-08-02 NOTE — PROGRESS NOTES
Chief Complaint  Pancreatitis and Heartburn    Subjective          History of Present Illness    Dorita Narayan is a  58 y.o. female presents for follow-up.  She is a patient of Dr. Pillai and is new to me.  She has a history of familial polyposis, total colectomy with J-pouch in 1989 at [facility].  She also has history of rheumatoid arthritis.  History of biliary stent placement x 2 by Dr. Margo Starks in remote past.      From 2023 note:   In June 2022 she proceeded to [facility] after severe back pain. 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 she was sent to [facility] in [place]. She states she underwent coil embolization of the splenic artery.       CTA of the abdomen and pelvis reviewed 7/12/2022 with splenic artery embolization with coils, large splenic infarct with large subcapsular fluid collection likely representing old hematoma measuring 13 x 9 cm which communicates with 2 small fluid-filled collections in the gastrohepatic ligament, diffuse fatty liver, status post colectomy without obstruction     She was hospitalized again in February in [place] due to abdominal pain.  She underwent repeat EGD at that time.     She reports ongoing epigastric pain with eating.  Pain is in the upper abdomen in the midline and the luq.  She has cut down on her intake.  Weight down 40 lb since last summer.  We discussed the results of her recent MRI which showed persistent fluid collection near the spleen.    She does have a history of chronic pancreatitis and follows with Dr. Villarreal at [facility].  She last saw him August 2023.    3/15/2022 EGD and ileoscopy revealed Spigelman class II duodenal polyp with adenomatous changes, ileal polyps which were adenomatous. Repeat ileoscopy in 1 year 2023.   Repeat egd in 2 years 2024.  EGD 2/14/23 with mild gastritis.- repeat in 2 years.      9/19/2023 ileoscopy noted a few erosions in ileoanal pouch -repeat in 1 year for surveillance  ----------------------------------  Patient  "reports that she has been doing okay.  She states that she was following with a dietitian at her gym and switched up her diet and feels like this was resulting in \"pancreas flares\" most recently and this happened a week ago.  She states she is feeling better now and has been tolerating diet well.  She reports that the dietitian steered her away from the diet recommended to her by Dr. Villarreal but that she is returning to the diet Dr. Umanzor advised her to follow as this worked better for her.  Otherwise she has a good appetite and tolerates diet well.  She has no black or bloody stool or unintentional weight loss.  She is due for repeat EGD and ileoscopy this year.      Objective   Vital Signs:   /68   Pulse 79   Temp 97.3 °F (36.3 °C)   Ht 172.7 cm (68\")   Wt 110 kg (241 lb 12.8 oz)   BMI 36.77 kg/m²       Physical Exam  Constitutional:       General: She is not in acute distress.     Appearance: Normal appearance.   Eyes:      General: No scleral icterus.  Cardiovascular:      Rate and Rhythm: Normal rate.   Pulmonary:      Effort: Pulmonary effort is normal.   Abdominal:      General: Abdomen is flat. Bowel sounds are normal. There is no distension.      Tenderness: There is no abdominal tenderness. There is no guarding.   Skin:     Coloration: Skin is not jaundiced.   Neurological:      General: No focal deficit present.      Mental Status: She is alert and oriented to person, place, and time.   Psychiatric:         Mood and Affect: Mood normal.         Behavior: Behavior normal.          Result Review :   The following data was reviewed by: Karen Seay PA-C on 08/02/2024:    CBC          12/12/2023    08:52 4/5/2024    08:51   CBC   WBC 9.92     11.71       RBC 4.51     4.31       Hemoglobin 13.3     12.9       Hematocrit 42.3     39.8       MCV 93.8     92.3       MCH 29.5     29.9       MCHC 31.4     32.4       RDW 14.6     14.9       Platelets 334     351          Details          This result " is from an external source.                     Assessment:   Diagnoses and all orders for this visit:    1. FAP (familial adenomatous polyposis) (Primary)    2. Anticoagulated    3. Pancreas divisum    4. Chronic pancreatitis, unspecified pancreatitis type          Plan:   -Due for repeat ileoscopy and EGD due to history of FAP  -Recommend she continue to follow with Dr. Villarreal-advised patient to let us or Dr. Villarreal know if she starts getting more frequent epigastric abdominal pain.  -Continue on Creon      Follow Up   No follow-ups on file.    Dragon dictation used throughout this note.         Karen Seay PA-C  [facility]  [address] [place] [address]  Office: [phone]

## 2024-09-19 ENCOUNTER — TELEPHONE (OUTPATIENT)
Dept: GASTROENTEROLOGY | Facility: CLINIC | Age: 59
End: 2024-09-19
Payer: MEDICARE

## 2024-09-24 RX ORDER — PANTOPRAZOLE SODIUM 20 MG/1
20 TABLET, DELAYED RELEASE ORAL DAILY
Qty: 90 TABLET | Refills: 2 | Status: SHIPPED | OUTPATIENT
Start: 2024-09-24

## 2024-09-26 ENCOUNTER — TELEPHONE (OUTPATIENT)
Dept: GASTROENTEROLOGY | Facility: CLINIC | Age: 59
End: 2024-09-26
Payer: MEDICARE

## 2024-09-26 NOTE — TELEPHONE ENCOUNTER
Caller: Dorita Narayan    Relationship: Self    Best call back number: [phone]    What is the best time to reach you: TOROHART    Who are you requesting to speak with (clinical staff, provider,  specific staff member):       What was the call regarding: PT IS CALLING TO CONFIRM THAT SHE IN FACT WILL BE HAVING BOTH UPPER AND LOWER SCOPES AT THE SAME TIME. PT ALSO STATES THAT SHE WILL NEED THE COMPLETE PREP FOR BOTH SCOPES. PLEASE SEND VIA blogfoster.     Is it okay if the provider responds through [email or web address]: NO

## 2024-09-30 NOTE — TELEPHONE ENCOUNTER
HUB GAVE DULCOLAX/MIRALAX INSTRUCTONS.  PLEASE CALL PT WITH ANY OTHER INSTRUCTIONS AND ANSWER CLINICAL QUESTIONS PT HAS.  PT HAS MANY QUESTIONS  [phone] (home)

## 2024-09-30 NOTE — TELEPHONE ENCOUNTER
Hub staff attempted to follow warm transfer process and was unsuccessful     Caller: Dorita Narayan    Relationship to patient: Self    Best call back number: [phone]    Patient is needing: PATIENT IS RETURNING A CALL FROM ALEC COFFEY REGARDING HER PROCEDURE PREP. PLEASE CALL BACK.

## 2024-09-30 NOTE — TELEPHONE ENCOUNTER
Clear liquids day prior-n.p.o. after midnight, One 10 to 12 ounce bottle of magnesium citrate at 6 PM the night prior to the procedure

## 2024-10-03 ENCOUNTER — TELEPHONE (OUTPATIENT)
Dept: GASTROENTEROLOGY | Facility: CLINIC | Age: 59
End: 2024-10-03
Payer: MEDICARE

## 2024-10-03 ENCOUNTER — TELEPHONE (OUTPATIENT)
Dept: GASTROENTEROLOGY | Facility: CLINIC | Age: 59
End: 2024-10-03

## 2024-10-03 NOTE — TELEPHONE ENCOUNTER
----- Message from Phill FUNEZ sent at 10/3/2024 10:34 AM EDT -----  Regarding: creon  Contact: [phone]  Pt says she needs her Creon increased.

## 2024-10-03 NOTE — TELEPHONE ENCOUNTER
Hub staff attempted to follow warm transfer process and was unsuccessful     Caller: Dorita Narayan    Relationship to patient: Self    Best call back number: [phone]    Patient is needing: PATIENT MISSED CALL FROM ERLINDA ALTAMIRANO AND IS REACHING OUT.  PLEASE RETURN CALL TO ASSIST.  THANK YOU

## 2024-10-03 NOTE — TELEPHONE ENCOUNTER
I called pt to confirm  scope for 10/14/24.Pt has no questions. I have went over prep instructions with pt. Arrival time is 2 pm.

## 2024-10-04 NOTE — TELEPHONE ENCOUNTER
Pt was admitted for pancreatitis at [facility], a couple months ago.  Dr Villarreal saw the pt and advised she increase [place] Creon to [address] 3 tabs with every meal due to her still having pain.    I have updated care everywhere, hospital admission on 8/8/24.

## 2024-10-11 RX ORDER — ONDANSETRON 4 MG/1
4 TABLET, FILM COATED ORAL EVERY 8 HOURS PRN
COMMUNITY
Start: 2024-08-11

## 2024-10-11 RX ORDER — HYDROCODONE BITARTRATE AND ACETAMINOPHEN 5; 325 MG/1; MG/1
1 TABLET ORAL EVERY 4 HOURS PRN
COMMUNITY

## 2024-10-14 ENCOUNTER — ANESTHESIA EVENT (OUTPATIENT)
Dept: GASTROENTEROLOGY | Facility: HOSPITAL | Age: 59
End: 2024-10-14
Payer: MEDICARE

## 2024-10-14 ENCOUNTER — HOSPITAL ENCOUNTER (OUTPATIENT)
Facility: HOSPITAL | Age: 59
Setting detail: HOSPITAL OUTPATIENT SURGERY
Discharge: HOME | End: 2024-10-14
Attending: INTERNAL MEDICINE | Admitting: INTERNAL MEDICINE
Payer: MEDICARE

## 2024-10-14 ENCOUNTER — ANESTHESIA (OUTPATIENT)
Dept: GASTROENTEROLOGY | Facility: HOSPITAL | Age: 59
End: 2024-10-14
Payer: MEDICARE

## 2024-10-14 VITALS
RESPIRATION RATE: 16 BRPM | HEART RATE: 72 BPM | HEIGHT: 69 IN | OXYGEN SATURATION: 95 % | SYSTOLIC BLOOD PRESSURE: 116 MMHG | BODY MASS INDEX: 32.98 KG/M2 | DIASTOLIC BLOOD PRESSURE: 68 MMHG | WEIGHT: 222.7 LBS

## 2024-10-14 DIAGNOSIS — D13.91 FAP (FAMILIAL ADENOMATOUS POLYPOSIS): ICD-10-CM

## 2024-10-14 PROCEDURE — 25010000002 PROPOFOL 10 MG/ML EMULSION: Performed by: NURSE ANESTHETIST, CERTIFIED REGISTERED

## 2024-10-14 PROCEDURE — 88305 TISSUE EXAM BY PATHOLOGIST: CPT | Performed by: INTERNAL MEDICINE

## 2024-10-14 PROCEDURE — 25810000003 SODIUM CHLORIDE 0.9 % SOLUTION: Performed by: INTERNAL MEDICINE

## 2024-10-14 PROCEDURE — 25010000002 LIDOCAINE 2% SOLUTION: Performed by: NURSE ANESTHETIST, CERTIFIED REGISTERED

## 2024-10-14 PROCEDURE — 25810000003 SODIUM CHLORIDE 0.9 % SOLUTION: Performed by: NURSE ANESTHETIST, CERTIFIED REGISTERED

## 2024-10-14 PROCEDURE — S0260 H&P FOR SURGERY: HCPCS | Performed by: INTERNAL MEDICINE

## 2024-10-14 PROCEDURE — 25010000002 GLYCOPYRROLATE 0.2 MG/ML SOLUTION: Performed by: NURSE ANESTHETIST, CERTIFIED REGISTERED

## 2024-10-14 RX ORDER — LIDOCAINE HYDROCHLORIDE 20 MG/ML
INJECTION, SOLUTION INFILTRATION; PERINEURAL AS NEEDED
Status: DISCONTINUED | OUTPATIENT
Start: 2024-10-14 | End: 2024-10-14 | Stop reason: SURG

## 2024-10-14 RX ORDER — SODIUM CHLORIDE 9 MG/ML
INJECTION, SOLUTION INTRAVENOUS CONTINUOUS PRN
Status: DISCONTINUED | OUTPATIENT
Start: 2024-10-14 | End: 2024-10-14 | Stop reason: SURG

## 2024-10-14 RX ORDER — PROPOFOL 10 MG/ML
VIAL (ML) INTRAVENOUS AS NEEDED
Status: DISCONTINUED | OUTPATIENT
Start: 2024-10-14 | End: 2024-10-14 | Stop reason: SURG

## 2024-10-14 RX ORDER — GLYCOPYRROLATE 0.2 MG/ML
INJECTION INTRAMUSCULAR; INTRAVENOUS AS NEEDED
Status: DISCONTINUED | OUTPATIENT
Start: 2024-10-14 | End: 2024-10-14 | Stop reason: SURG

## 2024-10-14 RX ORDER — SODIUM CHLORIDE 9 MG/ML
500 INJECTION, SOLUTION INTRAVENOUS ONCE
Status: COMPLETED | OUTPATIENT
Start: 2024-10-14 | End: 2024-10-14

## 2024-10-14 RX ADMIN — PROPOFOL 200 MCG/KG/MIN: 10 INJECTION, EMULSION INTRAVENOUS at 14:31

## 2024-10-14 RX ADMIN — SODIUM CHLORIDE 500 ML: 9 INJECTION, SOLUTION INTRAVENOUS at 13:38

## 2024-10-14 RX ADMIN — PROPOFOL 80 MG: 10 INJECTION, EMULSION INTRAVENOUS at 14:32

## 2024-10-14 RX ADMIN — SODIUM CHLORIDE: 9 INJECTION, SOLUTION INTRAVENOUS at 14:28

## 2024-10-14 RX ADMIN — GLYCOPYRROLATE 0.2 MG: 0.2 INJECTION INTRAMUSCULAR; INTRAVENOUS at 14:31

## 2024-10-14 RX ADMIN — LIDOCAINE HYDROCHLORIDE 100 MG: 20 INJECTION, SOLUTION INFILTRATION; PERINEURAL at 14:31

## 2024-10-14 NOTE — ANESTHESIA POSTPROCEDURE EVALUATION
Patient: Dorita Narayan    Procedure Summary       Date: 10/14/24 Room / Location: [facility] ENDOSCOPY 1 /  [facility]    Anesthesia Start: 1428 Anesthesia Stop: 1455    Procedures:       ESOPHAGOGASTRODUODENOSCOPY (Esophagus)      ILEOSCOPY Diagnosis:       FAP (familial adenomatous polyposis)      (FAP (familial adenomatous polyposis) [D13.91])    Surgeons: Marylou Pillai MD Provider: Rinku Alcaraz MD    Anesthesia Type: MAC ASA Status: 3            Anesthesia Type: MAC    Vitals  Vitals Value Taken Time   /63 10/14/24 1456   Temp     Pulse 77 10/14/24 1456   Resp 18 10/14/24 1456   SpO2 100 % 10/14/24 1456           Post Anesthesia Care and Evaluation    Patient location during evaluation: PACU  Patient participation: complete - patient participated  Level of consciousness: awake and alert  Pain management: adequate    Airway patency: patent  Anesthetic complications: No anesthetic complications    Cardiovascular status: acceptable  Respiratory status: acceptable  Hydration status: acceptable    Comments: --------------------            10/14/24               1456     --------------------   BP:       101/63     Pulse:      77       Resp:       18       SpO2:      100%     --------------------

## 2024-10-14 NOTE — H&P
[facility]  Pre Procedure History & Physical    Chief Complaint:   H/o FAP    Subjective     HPI:   59 yo here today for egd/ileoscopy. History of familial polyposis, total colectomy with J-pouch in 1989 at [facility].  She also has a history of rheumatoid arthritis.  H/o biliary stent placement x2 by Dr. Margo Starks in the remote past.    Last exam 9/23    Past Medical History:   Past Medical History:   Diagnosis Date    Achilles tendonitis     RIGHT     Anemia     Arthritis     osteoarthritis, RA    Bronchitis     Cancer     family history of colon cancer    Colon polyp May 1989    DVT (deep venous thrombosis)     bilateral ankle, hip, lung    Edema     BLE AT TIMES TAKES LASIX    Familial adenomatous polyposis     Familial polyposis     History of cellulitis 2019    LEFT LEG     History of kidney stones     History of migraine     Pancreas divisum     per patient    Pneumonia     Spondylisthesis        Past Surgical History:  Past Surgical History:   Procedure Laterality Date    ACHILLES TENDON SURGERY Right 07/20/2020    Procedure: RIGHT CALCANEAL EXOSTECTOMY AND RETROCALCANEAL BURSECTOMY, SECONDARY ACHILLES TENDON REPAIR RECONSTRUCTION ACHILLES TENDON REPAIR;  Surgeon: Chetan Rodriguez MD;  Location: [facility];  Service: Orthopedics;  Laterality: Right;    BILE DUCT STENT PLACEMENT      COLONOSCOPY  02/14/2023    Julius HWANG    CREATION / REVISION OF ILEOSTOMY / JEJUNOSTOMY      ENDOSCOPY N/A 03/15/2022    Procedure: ESOPHAGOGASTRODUODENOSCOPY with saline lift and duodenum polypectomy and cold bx;  Surgeon: Marylou Pillai MD;  Location: [facility] ENDOSCOPY;  Service: Gastroenterology;  Laterality: N/A;  PRE - heartburn, difficulty swallowing  POST - duodenum polyp, gastric polyps, esophagitis    ENDOSCOPY  02/14/2023    Julius HWANG    FLEXIBLE SIGMOIDOSCOPY  ???    ILEOSCOPY N/A 09/19/2023    Procedure: ILEOSCOPY WITH COLD BIOPSIES;  Surgeon: Marylou Pillai MD;  Location: [facility]  ENDOSCOPY;  Service: Gastroenterology;  Laterality: N/A;  pre: history of FAP  post: ileo erosions    KIDNEY STONE SURGERY      X2    RESECTION SMALL BOWEL / CLOSURE ILEOSTOMY      SIGMOIDOSCOPY N/A 03/15/2022    Procedure: SIGMOIDOSCOPY FLEXIBLE to anastamosis with cold polypectomies;  Surgeon: Marylou Pillai MD;  Location: [facility] ENDOSCOPY;  Service: Gastroenterology;  Laterality: N/A;  PRE - familial polyposis  POST - polyps, hemorrhoids    SPINE SURGERY      lumbar laminectomy decompression    SPLENIC ARTERY EMBOLIZATION  2022    TUBAL ABDOMINAL LIGATION      UPPER GASTROINTESTINAL ENDOSCOPY  -current       Family History:  Family History   Problem Relation Age of Onset    Colon cancer Mother             Colon cancer Father             Gordon Hyperthermia Neg Hx        Social History:   reports that she quit smoking about 4 years ago. Her smoking use included cigarettes. She started smoking about 17 years ago. She has never used smokeless tobacco. She reports that she does not currently use alcohol. She reports that she does not use drugs.    Medications:   Medications Prior to Admission   Medication Sig Dispense Refill Last Dose/Taking    Acetaminophen (TYLENOL EXTRA STRENGTH PO) Take 1,000 mg by mouth As Needed.   Taking As Needed    cholecalciferol (VITAMIN D3) 25 MCG (1000 UT) tablet Take 2 tablets by mouth Daily.   10/13/2024    ondansetron (ZOFRAN) 4 MG tablet Take 1 tablet by mouth Every 8 (Eight) Hours As Needed for Vomiting or Nausea.   Past Month    Pancrelipase, Lip-Prot-Amyl, (Creon) 03243-639064 units capsule delayed-release particles capsule Take 1 capsule by mouth 3 (Three) Times a Day With Meals. 90 capsule 5 Past Week    pantoprazole (PROTONIX) 20 MG EC tablet TAKE 1 TABLET BY MOUTH DAILY 90 tablet 2 10/13/2024    sulfaSALAzine (AZULFIDINE) 500 MG tablet Take 1 tablet by mouth 2 (Two) Times a Day.   Past Week    HYDROcodone-acetaminophen (NORCO) 5-325 MG per tablet  "Take 1 tablet by mouth Every 4 (Four) Hours As Needed for Mild Pain.   More than a month    methocarbamol (ROBAXIN) 750 MG tablet Take 1 tablet by mouth 2 (Two) Times a Day As Needed.   More than a month       Allergies:  Iodine, Latex, and Penicillins    ROS:    Pertinent items are noted in HPI     Objective     Blood pressure 135/73, pulse 93, resp. rate 18, height 174 cm (68.5\"), weight 101 kg (222 lb 11.2 oz), SpO2 97%.    Physical Exam   Constitutional: Pt is oriented to person, place, and time and well-developed, well-nourished, and in no distress.   Mouth/Throat: Oropharynx is clear and moist.   Neck: Normal range of motion.   Cardiovascular: Normal rate, regular rhythm    Pulmonary/Chest: Effort normal    Abdominal: Soft. Nontender  Skin: Skin is warm and dry.   Psychiatric: Mood, memory, affect and judgment normal.     Assessment & Plan     Diagnosis:  H/o FAP    Anticipated Surgical Procedure:  EGD/ileoscopy    The risks, benefits, and alternatives of this procedure have been discussed with the patient or the responsible party- the patient understands and agrees to proceed.                                                             "

## 2024-10-14 NOTE — DISCHARGE INSTRUCTIONS
For the next 24 hours patient needs to be with a responsible adult.    For 24 hours DO NOT drive, operate machinery, appliances, drink alcohol, make important decisions or sign legal documents.    Start with a light or bland diet if you are feeling sick to your stomach otherwise advance to regular diet as tolerated.    Follow recommendations on procedure report if provided by your doctor.    Call Dr Pillai for problems [phone]    Problems may include but not limited to: large amounts of bleeding, trouble breathing, repeated vomiting, severe unrelieved pain, fever or chills.

## 2024-10-14 NOTE — ANESTHESIA PREPROCEDURE EVALUATION
Anesthesia Evaluation     Patient summary reviewed and Nursing notes reviewed                Airway   Mallampati: II  TM distance: >3 FB  Neck ROM: full  No difficulty expected  Dental      Pulmonary    (+) a smoker Former,  Cardiovascular     ECG reviewed  Rhythm: regular  Rate: normal    (+) DVT      Neuro/Psych- negative ROS  GI/Hepatic/Renal/Endo    (+) obesity, renal disease- stones    Musculoskeletal (-) negative ROS    Abdominal    Substance History - negative use     OB/GYN negative ob/gyn ROS         Other                      Anesthesia Plan    ASA 3     MAC     (I have reviewed the patient's history with the patient and the chart, including all pertinent laboratory results and imaging. I have explained the risks of anesthesia including but not limited to dental damage, corneal abrasion, nerve injury, MI, stroke, and death. Questions asked and answered. Anesthetic plan discussed with patient and team as indicated. Patient expressed understanding of the above.    )  intravenous induction     Anesthetic plan, risks, benefits, and alternatives have been provided, discussed and informed consent has been obtained with: patient and spouse/significant other.    CODE STATUS:

## 2024-10-15 LAB
CYTO UR: NORMAL
LAB AP CASE REPORT: NORMAL
PATH REPORT.FINAL DX SPEC: NORMAL
PATH REPORT.GROSS SPEC: NORMAL

## 2024-10-23 ENCOUNTER — TELEPHONE (OUTPATIENT)
Dept: GASTROENTEROLOGY | Facility: CLINIC | Age: 59
End: 2024-10-23
Payer: MEDICARE

## 2024-10-23 NOTE — TELEPHONE ENCOUNTER
----- Message from Marylou Pillai sent at 10/22/2024  5:11 PM EDT -----  The polyp(s) biopsies showed adenomatous change. This is not cancerous but is considered potentially precancerous. Follow-up ileoscopy in 1 year is advised.  Repeat EGD in 2 years

## 2024-10-23 NOTE — TELEPHONE ENCOUNTER
Patient notified of results and recommendations and verbalized understanding  Recall placed for ileostomy 10/14/25  EGD recall placed for 10/14/26

## 2025-05-08 RX ORDER — PANTOPRAZOLE SODIUM 20 MG/1
20 TABLET, DELAYED RELEASE ORAL DAILY
Qty: 90 TABLET | Refills: 3 | Status: SHIPPED | OUTPATIENT
Start: 2025-05-08

## (undated) DEVICE — THE CARR-LOCKE INJECTION NEEDLE IS A SINGLE USE, DISPOSABLE, FLEXIBLE SHEATH INJECTION NEEDLE USED FOR THE INJECTION OF VARIOUS TYPES OF MEDIA THROUGH FLEXIBLE ENDOSCOPES.

## (undated) DEVICE — THE SINGLE USE ETRAP – POLYP TRAP IS USED FOR SUCTION RETRIEVAL OF ENDOSCOPICALLY REMOVED POLYPS.: Brand: ETRAP

## (undated) DEVICE — DRSNG GZ CURAD XEROFORM NONADHS 5X9IN STRL

## (undated) DEVICE — SENSR O2 OXIMAX FNGR A/ 18IN NONSTR

## (undated) DEVICE — CANN O2 ETCO2 FITS ALL CONN CO2 SMPL A/ 7IN DISP LF

## (undated) DEVICE — ADAPT CLN BIOGUARD AIR/H2O DISP

## (undated) DEVICE — PROXIMATE RH ROTATING HEAD SKIN STAPLERS (35 WIDE) CONTAINS 35 STAINLESS STEEL STAPLES: Brand: PROXIMATE

## (undated) DEVICE — SPNG LAP 18X18IN LF STRL PK/5

## (undated) DEVICE — UNDERCAST PADDING: Brand: DEROYAL

## (undated) DEVICE — TUBING, SUCTION, 1/4" X 10', STRAIGHT: Brand: MEDLINE

## (undated) DEVICE — BITEBLOCK OMNI BLOC

## (undated) DEVICE — LN SMPL CO2 SHTRM SD STREAM W/M LUER

## (undated) DEVICE — TOWEL,OR,DSP,ST,BLUE,STD,4/PK,20PK/CS: Brand: MEDLINE

## (undated) DEVICE — SINGLE-USE BIOPSY FORCEPS: Brand: RADIAL JAW 4

## (undated) DEVICE — SNAR POLYP SENSATION STDOVL 27 240 BX40

## (undated) DEVICE — KT ORCA ORCAPOD DISP STRL

## (undated) DEVICE — INTENDED FOR TISSUE SEPARATION, AND OTHER PROCEDURES THAT REQUIRE A SHARP SURGICAL BLADE TO PUNCTURE OR CUT.: Brand: BARD-PARKER ® CARBON RIB-BACK BLADES

## (undated) DEVICE — DISPOSABLE TOURNIQUET CUFF SINGLE BLADDER, SINGLE PORT AND QUICK CONNECT CONNECTOR: Brand: COLOR CUFF

## (undated) DEVICE — MSK ENDO PORT O2 POM ELITE CURAPLEX A/

## (undated) DEVICE — PK ORTHO MINOR TOWER 40

## (undated) DEVICE — BANDAGE,GAUZE,BULKEE II,4.5"X4.1YD,STRL: Brand: MEDLINE

## (undated) DEVICE — PREMIUM WET SKIN PREP TRAY: Brand: MEDLINE INDUSTRIES, INC.

## (undated) DEVICE — SUT VIC 2/0 CT2 27IN J269H

## (undated) DEVICE — SUT PROLN 3/0 FS2 18IN 8665G

## (undated) DEVICE — GLV SURG PREMIERPRO ORTHO LTX PF SZ8.5 BRN

## (undated) DEVICE — PRECISION THIN (9.0 X 0.38 X 25.0MM)

## (undated) DEVICE — STCKNT IMPERV 12IN STRL

## (undated) DEVICE — UNDYED BRAIDED (POLYGLACTIN 910), SYNTHETIC ABSORBABLE SUTURE: Brand: COATED VICRYL

## (undated) DEVICE — SPLNT PLSTR ORTHO 5IN

## (undated) DEVICE — GLV SURG BIOGEL LTX PF 8 1/2

## (undated) DEVICE — PAD,ABDOMINAL,8"X10",ST,LF: Brand: MEDLINE

## (undated) DEVICE — BNDG ELAS ELITE V/CLOSE 6IN 5YD LF STRL

## (undated) DEVICE — SINGLE-USE POLYPECTOMY SNARE: Brand: CAPTIVATOR II

## (undated) DEVICE — BLCK/BITE BLOX W/DENTL/RIM W/STRAP 54F